# Patient Record
Sex: MALE | Race: WHITE | NOT HISPANIC OR LATINO | Employment: OTHER | URBAN - NONMETROPOLITAN AREA
[De-identification: names, ages, dates, MRNs, and addresses within clinical notes are randomized per-mention and may not be internally consistent; named-entity substitution may affect disease eponyms.]

---

## 2017-01-17 ENCOUNTER — OFFICE VISIT (OUTPATIENT)
Dept: CARDIOLOGY | Facility: CLINIC | Age: 72
End: 2017-01-17
Payer: MEDICARE

## 2017-01-17 VITALS
BODY MASS INDEX: 30.93 KG/M2 | WEIGHT: 241 LBS | SYSTOLIC BLOOD PRESSURE: 132 MMHG | HEART RATE: 71 BPM | HEIGHT: 74 IN | DIASTOLIC BLOOD PRESSURE: 80 MMHG | OXYGEN SATURATION: 93 %

## 2017-01-17 DIAGNOSIS — I10 ESSENTIAL HYPERTENSION: ICD-10-CM

## 2017-01-17 DIAGNOSIS — R00.2 PALPITATIONS: ICD-10-CM

## 2017-01-17 DIAGNOSIS — E78.5 HYPERLIPIDEMIA, UNSPECIFIED HYPERLIPIDEMIA TYPE: ICD-10-CM

## 2017-01-17 DIAGNOSIS — R06.09 DOE (DYSPNEA ON EXERTION): ICD-10-CM

## 2017-01-17 PROCEDURE — 99214 OFFICE O/P EST MOD 30 MIN: CPT | Performed by: INTERNAL MEDICINE

## 2017-01-17 NOTE — Clinical Note
[facility] [address],   [address]  [place] [address]  Phone: [phone]  Fax: [phone]              Dio Bullard  1945    Encounter Date: 1/17/2017    Getachew Slaughter M.D.    Thank you for the referral. I had the pleasure of seeing Dio Bullard today in cardiology clinic. I've attached my visit note below. If you have any questions please feel free to give me a call anytime.      Prakash Humphries MD, PhD, [facility]  Cardiology and Lipidology  [facility]                                                                  PROGRESS NOTE:  Chief Complaint   Patient presents with   • Follow-Up       This patient is an established male who is here today to discuss:  Recheck BP and question about meds    Patient Active Problem List    Diagnosis Date Noted   • Insomnia 12/01/2016   • Hyperlipidemia 05/18/2016   • Hyperglycemia 05/18/2016   • Essential hypertension 08/05/2015   • Low back pain 03/23/2015   • Hamstring tear 01/29/2015   • Lumbar disc disease with radiculopathy 12/10/2014   • Osteoarthritis of left knee 09/05/2014   • Localized osteoarthritis of right knee 09/05/2014       Past Medical History   Diagnosis Date   • Hypertension    • Insomnia    • Cancer (CMS-HCC)    • Gilbert's disease      Past Surgical History   Procedure Laterality Date   • Hernia repair     • Prostatectomy, radial         Current Outpatient Prescriptions   Medication Sig Dispense Refill   • amlodipine (NORVASC) 10 MG Tab Take 1 Tab by mouth every day. 30 Tab 11   • Multiple Vitamins-Minerals (PRESERVISION AREDS) Cap Take  by mouth.     • Multiple Vitamins-Minerals (CENTRUM SILVER ADULT 50+ PO) Take  by mouth.     • Misc Natural Products (GLUCOS-CHONDROIT-MSM COMPLEX PO) Take  by mouth.     • Naproxen Sodium (ALEVE PO) Take  by mouth.     • zolpidem (AMBIEN CR) 12.5 MG CR tablet TAKE ONE TABLET BY MOUTH EVERY EVENING  "AS NEEDED FOR SLEEP 30 Tab 0     No current facility-administered medications for this visit.     Review of patient's allergies indicates no known allergies.      Review of Systems:     Constitutional: Denies fevers, Decreased 40 pd/8 months weight changes intentionally  Eyes: Denies changes in vision, no eye pain  Ears/Nose/Throat/Mouth: Denies nasal congestion or sore throat   Cardiovascular: Denies chest pain or palpitations   Respiratory: Denies shortness of breath , Denies cough  Gastrointestinal/Hepatic: Denies abdominal pain, nausea, vomiting, diarrhea, constipation or GI bleeding   Genitourinary: Denies bladder dysfunction, dysuria or frequency  Musculoskeletal/Rheum: Denies  joint pain and swelling   Skin/Breast: Denies rash, denies breast lumps or discharge  Neurological: Denies headache, confusion, memory loss or focal weakness/parasthesias  Psychiatric: denies mood disorder   Endocrine: denies hx of diabetes or thyroid dysfunction  Heme/Oncology/Lymph Nodes: Denies enlarged lymph nodes, denies brusing or known bleeding disorder  Allergic/Immunologic: Denies hx of allergies      All other systems were reviewed and are negative (AMA/CMS criteria)      Blood pressure 132/80, pulse 71, height 1.88 m (6' 2.02\"), weight 109.317 kg (241 lb), SpO2 93 %.  General Appearance: Well developed, Well nourished, No acute distress, Non-toxic appearance.    HENT: Normocephalic, Atraumatic, Oropharynx moist mucous membranes, Dentition: Mallampati 4 OP, Nose normal.    Eyes: PERRLA, EOMI, Conjunctiva normal, No discharge.  Neck: Normal range of motion, No cervical tenderness, Supple, No stridor, no JVD . No thyromegaly. No carotid bruit.  Cardiovascular: Normal heart rate, Normal rhythm, S1, S2, no S3, S4; No gallops; No murmurs, No rubs, . Extremitites with intact distal pulses, no cyanosis, clubbing or edema. No heaves, thrills, HJR;  Peripheral pulses: carotid 2+, brachial 2+, radial 2+, ulnar 2+, femoral 2+, popliteal " 2+, PT 2+, DP 2+;  Lungs: Respiratory effort is normal. Normal breath sounds, breath sounds clear to auscultation bilaterally, no rales, no rhonchi, no wheezing.    Abdomen: Bowel sounds normal, Soft, No tenderness, No guarding, No rebound, No masses, No hepatosplenomegaly.  Skin: Warm, Dry, No erythema, No rash, no induration or crepitus.  Neurologic: Alert & oriented x 3, Normal motor function, Abn  sensory function at both feet, No focal deficits noted, cranial nerves II through XII are normal, normal gait.  Psychiatric: Affect normal, Judgment normal, Mood nallely    Results for EARNESTINE MADRID (MRN [id number]) as of 1/17/2017 13:49   Ref. Range 4/7/2016 13:40 5/10/2016 07:50 5/10/2016 08:00   WBC Latest Ref Range: 4.8-10.8 K/uL  5.9    RBC Latest Ref Range: 4.70-6.10 M/uL  5.85    Hemoglobin Latest Ref Range: 14.0-18.0 g/dL  16.5    Hematocrit Latest Ref Range: 42.0-52.0 %  49.2    MCV Latest Ref Range: 80.0-94.0 fL  84.1    MCH Latest Ref Range: 27.0-31.0 pg  28.2    MCHC Latest Ref Range: 33.0-37.0 g/dL  33.5    RDW Latest Ref Range: 11.5-14.5 %  13.4    Platelet Count Latest Ref Range: 130-400 K/uL  144    MPV Latest Ref Range: 7.4-10.4 fL  9.0    Neutrophils Automated Latest Ref Range: 39.0-70.0 %  68.4    Abs Neutrophils Automated Latest Ref Range: 1.8-7.7 K/uL  4.0    Lymphocytes Automated Latest Ref Range: 21.0-50.0 %  21.4    Abs Lymph Automated Latest Ref Range: 1.2-4.8 K/uL  1.3    Eosinophils Automated Latest Ref Range: 0.0-5.0 %  1.7    Basophils Automated Latest Ref Range: 0.0-3.0 %  0.7    Monocytes Automated Latest Ref Range: 2.0-9.0 %  7.5    Eosinophil Count, Blood Latest Ref Range: 0.00-0.50 K/uL  0.10    Sodium Latest Ref Range: 136-145 mmol/L  142    Potassium Latest Ref Range: 3.5-5.1 mmol/L  4.3    Chloride Latest Ref Range:  mmol/L  108 (H)    Co2 Latest Ref Range: 21-32 mmol/L  19 (L)    Anion Gap Latest Ref Range: 10-18 mmol/L  19 (H)    Glucose Latest Ref Range: 74-99 mg/dL  103  (H)    Bun Latest Ref Range: 7-18 mg/dL  25 (H)    Creatinine Latest Ref Range: 0.8-1.3 mg/dL  0.8    GFR If  Latest Ref Range: >60 mL/min/1.73 m 2  >60    GFR If Non  Latest Ref Range: >60 mL/min/1.73 m 2  >60    Calcium Latest Ref Range: 8.5-11.0 mg/dL  8.5    AST(SGOT) Latest Ref Range: 15-37 U/L  26    ALT(SGPT) Latest Ref Range: 12-78 U/L  41    Alkaline Phosphatase Latest Ref Range:  U/L  88    Total Bilirubin Latest Ref Range: 0.2-1.0 mg/dL  1.3 (H)    Albumin Latest Ref Range: 3.4-5.0 g/dL  4.0    Total Protein Latest Ref Range: 6.4-8.2 g/dL  7.3    A-G Ratio Unknown  1.2    Cholesterol,Tot Latest Ref Range: 120-200 mg/dL  140    Triglycerides Latest Ref Range: 0-150 mg/dL  30    HDL Latest Ref Range: 40.0-60.0 mg/dL  57.0    Non HDL Cholesterol Latest Ref Range:    83    LDL Latest Ref Range: <100 mg/dL  77    Chol-Hdl Ratio Unknown  2.46    Prostatic Specific Antigen Tot Latest Ref Range: 0.00-4.00 ng/mL <0.13     TSH Latest Ref Range: 0.34-4.82 uIU/mL  0.93    Free T-4 Latest Ref Range: 0.77-1.61 ng/dL  1.01      Assessment and Plan.   71 y.o. male has lose 40 pds since last 4/2016 intentionally; No palpitation; BP is better with WT loss;     1. Essential hypertension  controlled    2. Palpitations    - CBC WITHOUT DIFFERENTIAL  - COMP METABOLIC PANEL; Future    3. JENNINGS (dyspnea on exertion)  gone    4. Hyperlipidemia, unspecified hyperlipidemia type    - LIPID PANEL      1. Essential hypertension     2. Palpitations  CBC WITHOUT DIFFERENTIAL    COMP METABOLIC PANEL   3. JENNINGS (dyspnea on exertion)     4. Hyperlipidemia, unspecified hyperlipidemia type  LIPID PANEL             Getachew Slaughter M.D.  [address] [place] [address] [address]  [place] [address]  VIA In Basket

## 2017-01-17 NOTE — MR AVS SNAPSHOT
"        Dio Bullard   2017 1:20 PM   Office Visit   MRN: [id number]    Department:  [facility]   Dept Phone:  [phone]    Description:  Male : 1945   Provider:  Prakash Humphries MD,[facility]           Reason for Visit     Follow-Up           Allergies as of 2017     No Known Allergies      You were diagnosed with     Essential hypertension   [3247607]       Palpitations   [785.1.ICD-9-CM]       JENNINGS (dyspnea on exertion)   [934545]       Hyperlipidemia, unspecified hyperlipidemia type   [7718433]         Vital Signs     Blood Pressure Pulse Height Weight Body Mass Index Oxygen Saturation    132/80 mmHg 71 1.88 m (6' 2.02\") 109.317 kg (241 lb) 30.93 kg/m2 93%    Smoking Status                   Never Smoker            Basic Information     Date Of Birth Sex Race Ethnicity Preferred Language    1945 Male White Non- English      Your appointments     2017  3:00 PM   Annual Exam with Getachew Slaughter M.D.   [facility] (--)    [address] [place] [id number]   [phone]              Problem List              ICD-10-CM Priority Class Noted - Resolved    Osteoarthritis of left knee M17.9   2014 - Present    Localized osteoarthritis of right knee M17.9   2014 - Present    Lumbar disc disease with radiculopathy M51.16   12/10/2014 - Present    Hamstring tear    2015 - Present    Low back pain M54.5   3/23/2015 - Present    Essential hypertension I10   2015 - Present    Hyperlipidemia E78.5   2016 - Present    Hyperglycemia R73.9   2016 - Present    Insomnia G47.00   2016 - Present      Health Maintenance        Date Due Completion Dates    IMM DTaP/Tdap/Td Vaccine (1 - Tdap) 1966 ---    IMM ZOSTER VACCINE 2007 ---    IMM PNEUMOCOCCAL 65+ (ADULT) LOW/MEDIUM RISK SERIES (1 of 2 - PCV13) 2012 ---    IMM INFLUENZA (1) 2016 ---    COLONOSCOPY 3/14/2024 3/14/2014, 2003 (Prv Comp)    Override on 2003: " Previously completed            Current Immunizations     No immunizations on file.      Below and/or attached are the medications your provider expects you to take. Review all of your home medications and newly ordered medications with your provider and/or pharmacist. Follow medication instructions as directed by your provider and/or pharmacist. Please keep your medication list with you and share with your provider. Update the information when medications are discontinued, doses are changed, or new medications (including over-the-counter products) are added; and carry medication information at all times in the event of emergency situations     Allergies:  No Known Allergies          Medications  Valid as of: January 17, 2017 -  1:58 PM    Generic Name Brand Name Tablet Size Instructions for use    AmLODIPine Besylate (Tab) NORVASC 10 MG Take 1 Tab by mouth every day.        Misc Natural Products   Take  by mouth.        Multiple Vitamins-Minerals   Take  by mouth.        Multiple Vitamins-Minerals (Cap) PRESERVISION AREDS  Take  by mouth.        Naproxen Sodium   Take  by mouth.        Zolpidem Tartrate (Tab CR) AMBIEN CR 12.5 MG TAKE ONE TABLET BY MOUTH EVERY EVENING AS NEEDED FOR SLEEP        .                 Medicines prescribed today were sent to:     [facility] #[id number] - [place], [place] - [address] [address]    [address] [address]    Phone: [phone] Fax: [phone]    Open 24 Hours?: No      Medication refill instructions:       If your prescription bottle indicates you have medication refills left, it is not necessary to call your provider’s office. Please contact your pharmacy and they will refill your medication.    If your prescription bottle indicates you do not have any refills left, you may request refills at any time through one of the following ways: The online [employer] system (except Urgent Care), by calling your provider’s office, or by asking your pharmacy to contact your  provider’s office with a refill request. Medication refills are processed only during regular business hours and may not be available until the next business day. Your provider may request additional information or to have a follow-up visit with you prior to refilling your medication.   *Please Note: Medication refills are assigned a new Rx number when refilled electronically. Your pharmacy may indicate that no refills were authorized even though a new prescription for the same medication is available at the pharmacy. Please request the medicine by name with the pharmacy before contacting your provider for a refill.        Your To Do List     Future Labs/Procedures Complete By Expires    COMP METABOLIC PANEL  As directed 1/17/2018         MyChart Status: Patient Declined

## 2017-01-17 NOTE — PROGRESS NOTES
Chief Complaint   Patient presents with   • Follow-Up       This patient is an established male who is here today to discuss:  Recheck BP and question about meds    Patient Active Problem List    Diagnosis Date Noted   • Insomnia 12/01/2016   • Hyperlipidemia 05/18/2016   • Hyperglycemia 05/18/2016   • Essential hypertension 08/05/2015   • Low back pain 03/23/2015   • Hamstring tear 01/29/2015   • Lumbar disc disease with radiculopathy 12/10/2014   • Osteoarthritis of left knee 09/05/2014   • Localized osteoarthritis of right knee 09/05/2014       Past Medical History   Diagnosis Date   • Hypertension    • Insomnia    • Cancer (CMS-HCC)    • Gilbert's disease      Past Surgical History   Procedure Laterality Date   • Hernia repair     • Prostatectomy, radial         Current Outpatient Prescriptions   Medication Sig Dispense Refill   • amlodipine (NORVASC) 10 MG Tab Take 1 Tab by mouth every day. 30 Tab 11   • Multiple Vitamins-Minerals (PRESERVISION AREDS) Cap Take  by mouth.     • Multiple Vitamins-Minerals (CENTRUM SILVER ADULT 50+ PO) Take  by mouth.     • Misc Natural Products (GLUCOS-CHONDROIT-MSM COMPLEX PO) Take  by mouth.     • Naproxen Sodium (ALEVE PO) Take  by mouth.     • zolpidem (AMBIEN CR) 12.5 MG CR tablet TAKE ONE TABLET BY MOUTH EVERY EVENING AS NEEDED FOR SLEEP 30 Tab 0     No current facility-administered medications for this visit.     Review of patient's allergies indicates no known allergies.      Review of Systems:     Constitutional: Denies fevers, Decreased 40 pd/8 months weight changes intentionally  Eyes: Denies changes in vision, no eye pain  Ears/Nose/Throat/Mouth: Denies nasal congestion or sore throat   Cardiovascular: Denies chest pain or palpitations   Respiratory: Denies shortness of breath , Denies cough  Gastrointestinal/Hepatic: Denies abdominal pain, nausea, vomiting, diarrhea, constipation or GI bleeding   Genitourinary: Denies bladder dysfunction, dysuria or  "frequency  Musculoskeletal/Rheum: Denies  joint pain and swelling   Skin/Breast: Denies rash, denies breast lumps or discharge  Neurological: Denies headache, confusion, memory loss or focal weakness/parasthesias  Psychiatric: denies mood disorder   Endocrine: denies hx of diabetes or thyroid dysfunction  Heme/Oncology/Lymph Nodes: Denies enlarged lymph nodes, denies brusing or known bleeding disorder  Allergic/Immunologic: Denies hx of allergies      All other systems were reviewed and are negative (AMA/CMS criteria)      Blood pressure 132/80, pulse 71, height 1.88 m (6' 2.02\"), weight 109.317 kg (241 lb), SpO2 93 %.  General Appearance: Well developed, Well nourished, No acute distress, Non-toxic appearance.    HENT: Normocephalic, Atraumatic, Oropharynx moist mucous membranes, Dentition: Mallampati 4 OP, Nose normal.    Eyes: PERRLA, EOMI, Conjunctiva normal, No discharge.  Neck: Normal range of motion, No cervical tenderness, Supple, No stridor, no JVD . No thyromegaly. No carotid bruit.  Cardiovascular: Normal heart rate, Normal rhythm, S1, S2, no S3, S4; No gallops; No murmurs, No rubs, . Extremitites with intact distal pulses, no cyanosis, clubbing or edema. No heaves, thrills, HJR;  Peripheral pulses: carotid 2+, brachial 2+, radial 2+, ulnar 2+, femoral 2+, popliteal 2+, PT 2+, DP 2+;  Lungs: Respiratory effort is normal. Normal breath sounds, breath sounds clear to auscultation bilaterally, no rales, no rhonchi, no wheezing.    Abdomen: Bowel sounds normal, Soft, No tenderness, No guarding, No rebound, No masses, No hepatosplenomegaly.  Skin: Warm, Dry, No erythema, No rash, no induration or crepitus.  Neurologic: Alert & oriented x 3, Normal motor function, Abn  sensory function at both feet, No focal deficits noted, cranial nerves II through XII are normal, normal gait.  Psychiatric: Affect normal, Judgment normal, Mood nallely    Results for EARNESTINE MADRID (MRN [id number]) as of 1/17/2017 13:49   Ref. " Range 4/7/2016 13:40 5/10/2016 07:50 5/10/2016 08:00   WBC Latest Ref Range: 4.8-10.8 K/uL  5.9    RBC Latest Ref Range: 4.70-6.10 M/uL  5.85    Hemoglobin Latest Ref Range: 14.0-18.0 g/dL  16.5    Hematocrit Latest Ref Range: 42.0-52.0 %  49.2    MCV Latest Ref Range: 80.0-94.0 fL  84.1    MCH Latest Ref Range: 27.0-31.0 pg  28.2    MCHC Latest Ref Range: 33.0-37.0 g/dL  33.5    RDW Latest Ref Range: 11.5-14.5 %  13.4    Platelet Count Latest Ref Range: 130-400 K/uL  144    MPV Latest Ref Range: 7.4-10.4 fL  9.0    Neutrophils Automated Latest Ref Range: 39.0-70.0 %  68.4    Abs Neutrophils Automated Latest Ref Range: 1.8-7.7 K/uL  4.0    Lymphocytes Automated Latest Ref Range: 21.0-50.0 %  21.4    Abs Lymph Automated Latest Ref Range: 1.2-4.8 K/uL  1.3    Eosinophils Automated Latest Ref Range: 0.0-5.0 %  1.7    Basophils Automated Latest Ref Range: 0.0-3.0 %  0.7    Monocytes Automated Latest Ref Range: 2.0-9.0 %  7.5    Eosinophil Count, Blood Latest Ref Range: 0.00-0.50 K/uL  0.10    Sodium Latest Ref Range: 136-145 mmol/L  142    Potassium Latest Ref Range: 3.5-5.1 mmol/L  4.3    Chloride Latest Ref Range:  mmol/L  108 (H)    Co2 Latest Ref Range: 21-32 mmol/L  19 (L)    Anion Gap Latest Ref Range: 10-18 mmol/L  19 (H)    Glucose Latest Ref Range: 74-99 mg/dL  103 (H)    Bun Latest Ref Range: 7-18 mg/dL  25 (H)    Creatinine Latest Ref Range: 0.8-1.3 mg/dL  0.8    GFR If  Latest Ref Range: >60 mL/min/1.73 m 2  >60    GFR If Non  Latest Ref Range: >60 mL/min/1.73 m 2  >60    Calcium Latest Ref Range: 8.5-11.0 mg/dL  8.5    AST(SGOT) Latest Ref Range: 15-37 U/L  26    ALT(SGPT) Latest Ref Range: 12-78 U/L  41    Alkaline Phosphatase Latest Ref Range:  U/L  88    Total Bilirubin Latest Ref Range: 0.2-1.0 mg/dL  1.3 (H)    Albumin Latest Ref Range: 3.4-5.0 g/dL  4.0    Total Protein Latest Ref Range: 6.4-8.2 g/dL  7.3    A-G Ratio Unknown  1.2    Cholesterol,Tot Latest  Ref Range: 120-200 mg/dL  140    Triglycerides Latest Ref Range: 0-150 mg/dL  30    HDL Latest Ref Range: 40.0-60.0 mg/dL  57.0    Non HDL Cholesterol Latest Ref Range:    83    LDL Latest Ref Range: <100 mg/dL  77    Chol-Hdl Ratio Unknown  2.46    Prostatic Specific Antigen Tot Latest Ref Range: 0.00-4.00 ng/mL <0.13     TSH Latest Ref Range: 0.34-4.82 uIU/mL  0.93    Free T-4 Latest Ref Range: 0.77-1.61 ng/dL  1.01      Assessment and Plan.   71 y.o. male has lose 40 pds since last 4/2016 intentionally; No palpitation; BP is better with WT loss;     1. Essential hypertension  controlled    2. Palpitations    - CBC WITHOUT DIFFERENTIAL  - COMP METABOLIC PANEL; Future    3. JENNINGS (dyspnea on exertion)  gone    4. Hyperlipidemia, unspecified hyperlipidemia type    - LIPID PANEL      1. Essential hypertension     2. Palpitations  CBC WITHOUT DIFFERENTIAL    COMP METABOLIC PANEL   3. JENNINGS (dyspnea on exertion)     4. Hyperlipidemia, unspecified hyperlipidemia type  LIPID PANEL

## 2017-04-19 DIAGNOSIS — I10 ESSENTIAL HYPERTENSION: ICD-10-CM

## 2017-04-24 RX ORDER — AMLODIPINE BESYLATE 10 MG/1
TABLET ORAL
Qty: 90 TAB | Refills: 3 | Status: SHIPPED | OUTPATIENT
Start: 2017-04-24 | End: 2018-04-24 | Stop reason: SDUPTHER

## 2017-08-01 ENCOUNTER — OFFICE VISIT (OUTPATIENT)
Dept: CARDIOLOGY | Facility: CLINIC | Age: 72
End: 2017-08-01
Payer: MEDICARE

## 2017-08-01 VITALS
OXYGEN SATURATION: 94 % | HEART RATE: 79 BPM | BODY MASS INDEX: 31.7 KG/M2 | SYSTOLIC BLOOD PRESSURE: 130 MMHG | HEIGHT: 74 IN | WEIGHT: 247 LBS | DIASTOLIC BLOOD PRESSURE: 80 MMHG

## 2017-08-01 DIAGNOSIS — R00.2 PALPITATIONS: ICD-10-CM

## 2017-08-01 DIAGNOSIS — E78.5 HYPERLIPIDEMIA, UNSPECIFIED HYPERLIPIDEMIA TYPE: ICD-10-CM

## 2017-08-01 DIAGNOSIS — I10 ESSENTIAL HYPERTENSION: ICD-10-CM

## 2017-08-01 DIAGNOSIS — R06.09 DOE (DYSPNEA ON EXERTION): ICD-10-CM

## 2017-08-01 DIAGNOSIS — E66.9 OBESITY (BMI 30.0-34.9): ICD-10-CM

## 2017-08-01 PROCEDURE — 99214 OFFICE O/P EST MOD 30 MIN: CPT | Performed by: INTERNAL MEDICINE

## 2017-08-01 RX ORDER — DIPHENHYDRAMINE HCL 25 MG
25 TABLET ORAL EVERY 6 HOURS PRN
COMMUNITY
End: 2021-03-16

## 2017-08-01 NOTE — PROGRESS NOTES
Chief Complaint   Patient presents with   • Follow-Up     htn         This patient is an established male who is here today to discuss:  Recheck BP and question about meds        Patient Active Problem List    Diagnosis Date Noted   • Insomnia 12/01/2016   • Hyperlipidemia 05/18/2016   • Hyperglycemia 05/18/2016   • Essential hypertension 08/05/2015   • Low back pain 03/23/2015   • Hamstring tear 01/29/2015   • Lumbar disc disease with radiculopathy 12/10/2014   • Osteoarthritis of left knee 09/05/2014   • Localized osteoarthritis of right knee 09/05/2014       Past Medical History   Diagnosis Date   • Hypertension    • Insomnia    • Cancer (CMS-HCC)    • Gilbert's disease      Past Surgical History   Procedure Laterality Date   • Hernia repair     • Prostatectomy, radial       Social History     Social History   • Marital Status:      Spouse Name: N/A   • Number of Children: N/A   • Years of Education: N/A     Social History Main Topics   • Smoking status: Never Smoker    • Smokeless tobacco: Never Used   • Alcohol Use: No      Comment: occasionally   • Drug Use: No   • Sexual Activity: Not Asked     Other Topics Concern   • None     Social History Narrative     Family History   Problem Relation Age of Onset   • Heart Disease Neg Hx        Current Outpatient Prescriptions   Medication Sig Dispense Refill   • Diphenhydramine-APAP, sleep, (TYLENOL PM EXTRA STRENGTH)  MG Tab Take  by mouth.     • diphenhydrAMINE (BENADRYL) 25 MG Tab Take 25 mg by mouth every 6 hours as needed for Sleep.     • amlodipine (NORVASC) 10 MG Tab TAKE ONE TABLET BY MOUTH DAILY 90 Tab 3   • Multiple Vitamins-Minerals (PRESERVISION AREDS) Cap Take  by mouth.     • Multiple Vitamins-Minerals (CENTRUM SILVER ADULT 50+ PO) Take  by mouth.     • Misc Natural Products (GLUCOS-CHONDROIT-MSM COMPLEX PO) Take  by mouth.     • zolpidem (AMBIEN CR) 12.5 MG CR tablet TAKE ONE TABLET BY MOUTH EVERY EVENING AS NEEDED FOR SLEEP 30 Tab 0   •  "Naproxen Sodium (ALEVE PO) Take  by mouth.       No current facility-administered medications for this visit.     Review of patient's allergies indicates no known allergies.    Review of Systems:     Constitutional: Denies fevers, Denies weight changes  Eyes: Denies changes in vision, no eye pain  Ears/Nose/Throat/Mouth: Denies nasal congestion or sore throat   Cardiovascular: Denies chest pain or palpitations   Respiratory: Denies shortness of breath , Denies cough  Gastrointestinal/Hepatic: Denies abdominal pain, nausea, vomiting, diarrhea, constipation or GI bleeding   Genitourinary: Denies bladder dysfunction, dysuria or frequency  Musculoskeletal/Rheum: Denies  joint pain and swelling   Skin/Breast: Denies rash, denies breast lumps or discharge  Neurological: Denies headache, confusion, memory loss or focal weakness/parasthesias  Psychiatric: denies mood disorder   Endocrine: denies hx of diabetes or thyroid dysfunction  Heme/Oncology/Lymph Nodes: Denies enlarged lymph nodes, denies brusing or known bleeding disorder  Allergic/Immunologic: Denies hx of allergies      All other systems were reviewed and are negative (AMA/CMS criteria)      Blood pressure 130/80, pulse 79, height 1.88 m (6' 2.02\"), weight 112.038 kg (247 lb), SpO2 94 %.  General Appearance: Well developed, Well nourished, No acute distress, Non-toxic appearance.    HENT: Normocephalic, Atraumatic, Oropharynx moist mucous membranes, Dentition: Mallampati 4 OP, Nose normal.    Eyes: PERRLA, EOMI, Conjunctiva normal, No discharge.  Neck: Normal range of motion, No cervical tenderness, Supple, No stridor, no JVD . No thyromegaly. No carotid bruit.  Cardiovascular: Normal heart rate, Normal rhythm, S1, S2, no S3, S4; No gallops; No murmurs, No rubs, . Extremitites with intact distal pulses, no cyanosis, clubbing or edema. No heaves, thrills, HJR;  Peripheral pulses: carotid 2+, brachial 2+, radial 2+, ulnar 2+, femoral 2+, popliteal 2+, PT 2+, DP " 2+;  Lungs: Respiratory effort is normal. Normal breath sounds, breath sounds clear to auscultation bilaterally, no rales, no rhonchi, no wheezing.    Abdomen: Bowel sounds normal, Soft, No tenderness, No guarding, No rebound, No masses, No hepatosplenomegaly.  Skin: Warm, Dry, No erythema, No rash, no induration or crepitus.  Neurologic: Alert & oriented x 3, Normal motor function, Abn  sensory function at both feet, No focal deficits noted, cranial nerves II through XII are normal, normal gait.  Psychiatric: Affect normal, Judgment normal, Mood nallely    Results for EARNESTINE MADRID (MRN [id number]) as of 8/1/2017 15:26   Ref. Range 2/16/2017 10:15 5/1/2017 13:30 7/10/2017 10:00   WBC Latest Ref Range: 4.8-10.8 K/uL   7.5   RBC Latest Ref Range: 4.70-6.10 M/uL   5.46   Hemoglobin Latest Ref Range: 14.0-18.0 g/dL   16.0   Hematocrit Latest Ref Range: 42.0-52.0 %   45.9   MCV Latest Ref Range: 80.0-94.0 fL   84.1   MCH Latest Ref Range: 27.0-31.0 pg   29.3   MCHC Latest Ref Range: 33.0-37.0 g/dL   34.9   RDW Latest Ref Range: 11.5-14.5 %   13.3   Platelet Count Latest Ref Range: 130-400 K/uL   147   MPV Latest Ref Range: 7.4-10.4 fL   9.2   Neutrophils Automated Latest Ref Range: 39.0-70.0 %   68.9   Abs Neutrophils Automated Latest Ref Range: 1.8-7.7 K/uL   5.2   Lymphocytes Automated Latest Ref Range: 21.0-50.0 %   22.4   Abs Lymph Automated Latest Ref Range: 1.2-4.8 K/uL   1.7   Eosinophils Automated Latest Ref Range: 0.0-5.0 %   0.8   Basophils Automated Latest Ref Range: 0.0-3.0 %   0.4   Monocytes Automated Latest Ref Range: 2.0-9.0 %   6.8   Eosinophil Count, Blood Latest Ref Range: 0.00-0.50 K/uL   0.06   Sodium Latest Ref Range: 136-145 mmol/L   143   Potassium Latest Ref Range: 3.5-5.1 mmol/L   3.6   Chloride Latest Ref Range:  mmol/L   107   Co2 Latest Ref Range: 21-32 mmol/L   24   Anion Gap Latest Ref Range: 10-18 mmol/L   16   Glucose Latest Ref Range: 74-99 mg/dL   94   Bun Latest Ref Range: 7-18  mg/dL   18   Creatinine Latest Ref Range: 0.8-1.3 mg/dL 0.8  0.9   GFR If  Latest Ref Range: >60 mL/min/1.73 m 2 >60  >60   GFR If Non  Latest Ref Range: >60 mL/min/1.73 m 2 >60  >60   Calcium Latest Ref Range: 8.5-11.0 mg/dL   8.6   AST(SGOT) Latest Ref Range: 15-37 U/L   32   ALT(SGPT) Latest Ref Range: 12-78 U/L   46   Alkaline Phosphatase Latest Ref Range:  U/L   67   Total Bilirubin Latest Ref Range: 0.2-1.0 mg/dL   1.8 (H)   Albumin Latest Ref Range: 3.4-5.0 g/dL   3.9   Total Protein Latest Ref Range: 6.4-8.2 g/dL   6.9   A-G Ratio Unknown   1.3   Cholesterol,Tot Latest Ref Range: 120-200 mg/dL   144   Triglycerides Latest Ref Range: 0-150 mg/dL   31   HDL Latest Ref Range: 40.0-60.0 mg/dL   75.0 (H)   Non HDL Cholesterol Latest Ref Range:     69   LDL Latest Ref Range: <100 mg/dL   63   Chol-Hdl Ratio Unknown   1.92   Prostatic Specific Antigen Tot Latest Ref Range: 0.00-4.00 ng/mL  <0.13    TSH Latest Ref Range: 0.36-3.74 uIU/mL   1.21   Free T-4 Latest Ref Range: 0.76-1.46 ng/dL   1.25     Assessment and Plan.   71 y.o. male has HTN well controlled; K_ 3.6 aims for >4.0; RD to help with his diet and WT loss;     1. JENNINGS (dyspnea on exertion)  stable    2. Palpitations  rare    3. Essential hypertension  controlled    4. Hyperlipidemia, unspecified hyperlipidemia type  Reviewed and adequate, reassured    5. Obesity (BMI 30.0-34.9)  stable      Return to clinic in  6  months    1. JENNINGS (dyspnea on exertion)     2. Palpitations     3. Essential hypertension     4. Hyperlipidemia, unspecified hyperlipidemia type     5. Obesity (BMI 30.0-34.9)

## 2017-08-01 NOTE — Clinical Note
[facility] [address],   [address]nd [address]  [place] [address]  Phone: [phone]  Fax: [phone]              Dio Bullard  1945    Encounter Date: 8/1/2017    Getachew Slaughter M.D.    Thank you for the referral. I had the pleasure of seeing Dio Bullard today in cardiology clinic. I've attached my visit note below. If you have any questions please feel free to give me a call anytime.      Prakash Humphries MD, PhD, [facility]  Cardiology and Lipidology  [facility]                                                                    PROGRESS NOTE:  Chief Complaint   Patient presents with   • Follow-Up     htn         This patient is an established male who is here today to discuss:  Recheck BP and question about meds        Patient Active Problem List    Diagnosis Date Noted   • Insomnia 12/01/2016   • Hyperlipidemia 05/18/2016   • Hyperglycemia 05/18/2016   • Essential hypertension 08/05/2015   • Low back pain 03/23/2015   • Hamstring tear 01/29/2015   • Lumbar disc disease with radiculopathy 12/10/2014   • Osteoarthritis of left knee 09/05/2014   • Localized osteoarthritis of right knee 09/05/2014       Past Medical History   Diagnosis Date   • Hypertension    • Insomnia    • Cancer (CMS-HCC)    • Gilbert's disease      Past Surgical History   Procedure Laterality Date   • Hernia repair     • Prostatectomy, radial       Social History     Social History   • Marital Status:      Spouse Name: N/A   • Number of Children: N/A   • Years of Education: N/A     Social History Main Topics   • Smoking status: Never Smoker    • Smokeless tobacco: Never Used   • Alcohol Use: No      Comment: occasionally   • Drug Use: No   • Sexual Activity: Not Asked     Other Topics Concern   • None     Social History Narrative     Family History   Problem Relation Age of Onset   • Heart Disease Neg Hx        Current  "Outpatient Prescriptions   Medication Sig Dispense Refill   • Diphenhydramine-APAP, sleep, (TYLENOL PM EXTRA STRENGTH)  MG Tab Take  by mouth.     • diphenhydrAMINE (BENADRYL) 25 MG Tab Take 25 mg by mouth every 6 hours as needed for Sleep.     • amlodipine (NORVASC) 10 MG Tab TAKE ONE TABLET BY MOUTH DAILY 90 Tab 3   • Multiple Vitamins-Minerals (PRESERVISION AREDS) Cap Take  by mouth.     • Multiple Vitamins-Minerals (CENTRUM SILVER ADULT 50+ PO) Take  by mouth.     • Misc Natural Products (GLUCOS-CHONDROIT-MSM COMPLEX PO) Take  by mouth.     • zolpidem (AMBIEN CR) 12.5 MG CR tablet TAKE ONE TABLET BY MOUTH EVERY EVENING AS NEEDED FOR SLEEP 30 Tab 0   • Naproxen Sodium (ALEVE PO) Take  by mouth.       No current facility-administered medications for this visit.     Review of patient's allergies indicates no known allergies.    Review of Systems:     Constitutional: Denies fevers, Denies weight changes  Eyes: Denies changes in vision, no eye pain  Ears/Nose/Throat/Mouth: Denies nasal congestion or sore throat   Cardiovascular: Denies chest pain or palpitations   Respiratory: Denies shortness of breath , Denies cough  Gastrointestinal/Hepatic: Denies abdominal pain, nausea, vomiting, diarrhea, constipation or GI bleeding   Genitourinary: Denies bladder dysfunction, dysuria or frequency  Musculoskeletal/Rheum: Denies  joint pain and swelling   Skin/Breast: Denies rash, denies breast lumps or discharge  Neurological: Denies headache, confusion, memory loss or focal weakness/parasthesias  Psychiatric: denies mood disorder   Endocrine: denies hx of diabetes or thyroid dysfunction  Heme/Oncology/Lymph Nodes: Denies enlarged lymph nodes, denies brusing or known bleeding disorder  Allergic/Immunologic: Denies hx of allergies      All other systems were reviewed and are negative (AMA/CMS criteria)      Blood pressure 130/80, pulse 79, height 1.88 m (6' 2.02\"), weight 112.038 kg (247 lb), SpO2 94 %.  General " Appearance: Well developed, Well nourished, No acute distress, Non-toxic appearance.    HENT: Normocephalic, Atraumatic, Oropharynx moist mucous membranes, Dentition: Mallampati 4 OP, Nose normal.    Eyes: PERRLA, EOMI, Conjunctiva normal, No discharge.  Neck: Normal range of motion, No cervical tenderness, Supple, No stridor, no JVD . No thyromegaly. No carotid bruit.  Cardiovascular: Normal heart rate, Normal rhythm, S1, S2, no S3, S4; No gallops; No murmurs, No rubs, . Extremitites with intact distal pulses, no cyanosis, clubbing or edema. No heaves, thrills, HJR;  Peripheral pulses: carotid 2+, brachial 2+, radial 2+, ulnar 2+, femoral 2+, popliteal 2+, PT 2+, DP 2+;  Lungs: Respiratory effort is normal. Normal breath sounds, breath sounds clear to auscultation bilaterally, no rales, no rhonchi, no wheezing.    Abdomen: Bowel sounds normal, Soft, No tenderness, No guarding, No rebound, No masses, No hepatosplenomegaly.  Skin: Warm, Dry, No erythema, No rash, no induration or crepitus.  Neurologic: Alert & oriented x 3, Normal motor function, Abn  sensory function at both feet, No focal deficits noted, cranial nerves II through XII are normal, normal gait.  Psychiatric: Affect normal, Judgment normal, Mood nallely    Results for EARNESTINE MADRID (MRN [id number]) as of 8/1/2017 15:26   Ref. Range 2/16/2017 10:15 5/1/2017 13:30 7/10/2017 10:00   WBC Latest Ref Range: 4.8-10.8 K/uL   7.5   RBC Latest Ref Range: 4.70-6.10 M/uL   5.46   Hemoglobin Latest Ref Range: 14.0-18.0 g/dL   16.0   Hematocrit Latest Ref Range: 42.0-52.0 %   45.9   MCV Latest Ref Range: 80.0-94.0 fL   84.1   MCH Latest Ref Range: 27.0-31.0 pg   29.3   MCHC Latest Ref Range: 33.0-37.0 g/dL   34.9   RDW Latest Ref Range: 11.5-14.5 %   13.3   Platelet Count Latest Ref Range: 130-400 K/uL   147   MPV Latest Ref Range: 7.4-10.4 fL   9.2   Neutrophils Automated Latest Ref Range: 39.0-70.0 %   68.9   Abs Neutrophils Automated Latest Ref Range: 1.8-7.7  K/uL   5.2   Lymphocytes Automated Latest Ref Range: 21.0-50.0 %   22.4   Abs Lymph Automated Latest Ref Range: 1.2-4.8 K/uL   1.7   Eosinophils Automated Latest Ref Range: 0.0-5.0 %   0.8   Basophils Automated Latest Ref Range: 0.0-3.0 %   0.4   Monocytes Automated Latest Ref Range: 2.0-9.0 %   6.8   Eosinophil Count, Blood Latest Ref Range: 0.00-0.50 K/uL   0.06   Sodium Latest Ref Range: 136-145 mmol/L   143   Potassium Latest Ref Range: 3.5-5.1 mmol/L   3.6   Chloride Latest Ref Range:  mmol/L   107   Co2 Latest Ref Range: 21-32 mmol/L   24   Anion Gap Latest Ref Range: 10-18 mmol/L   16   Glucose Latest Ref Range: 74-99 mg/dL   94   Bun Latest Ref Range: 7-18 mg/dL   18   Creatinine Latest Ref Range: 0.8-1.3 mg/dL 0.8  0.9   GFR If  Latest Ref Range: >60 mL/min/1.73 m 2 >60  >60   GFR If Non  Latest Ref Range: >60 mL/min/1.73 m 2 >60  >60   Calcium Latest Ref Range: 8.5-11.0 mg/dL   8.6   AST(SGOT) Latest Ref Range: 15-37 U/L   32   ALT(SGPT) Latest Ref Range: 12-78 U/L   46   Alkaline Phosphatase Latest Ref Range:  U/L   67   Total Bilirubin Latest Ref Range: 0.2-1.0 mg/dL   1.8 (H)   Albumin Latest Ref Range: 3.4-5.0 g/dL   3.9   Total Protein Latest Ref Range: 6.4-8.2 g/dL   6.9   A-G Ratio Unknown   1.3   Cholesterol,Tot Latest Ref Range: 120-200 mg/dL   144   Triglycerides Latest Ref Range: 0-150 mg/dL   31   HDL Latest Ref Range: 40.0-60.0 mg/dL   75.0 (H)   Non HDL Cholesterol Latest Ref Range:     69   LDL Latest Ref Range: <100 mg/dL   63   Chol-Hdl Ratio Unknown   1.92   Prostatic Specific Antigen Tot Latest Ref Range: 0.00-4.00 ng/mL  <0.13    TSH Latest Ref Range: 0.36-3.74 uIU/mL   1.21   Free T-4 Latest Ref Range: 0.76-1.46 ng/dL   1.25     Assessment and Plan.   71 y.o. male has HTN well controlled; K_ 3.6 aims for >4.0; RD to help with his diet and WT loss;     1. JENNINGS (dyspnea on exertion)  stable    2. Palpitations  rare    3. Essential  hypertension  controlled    4. Hyperlipidemia, unspecified hyperlipidemia type  Reviewed and adequate, reassured    5. Obesity (BMI 30.0-34.9)  stable      Return to clinic in  6  months    1. JENNINGS (dyspnea on exertion)     2. Palpitations     3. Essential hypertension     4. Hyperlipidemia, unspecified hyperlipidemia type     5. Obesity (BMI 30.0-34.9)               Getachew Slaughter M.D.  [address] [place] [address] [address]  [facility] [address]  VIA In Basket

## 2017-08-01 NOTE — MR AVS SNAPSHOT
"Dio Bullard   2017 3:20 PM   Office Visit   MRN: [id number]    Department:  Heart [facility]   Dept Phone:  [phone]    Description:  Male : 1945   Provider:  Prakash Humphries MD,formerly [facility]           Reason for Visit     Follow-Up htn      Allergies as of 2017     No Known Allergies      You were diagnosed with     JENNINGS (dyspnea on exertion)   [890748]       Palpitations   [785.1.ICD-9-CM]       Essential hypertension   [9726224]       Hyperlipidemia, unspecified hyperlipidemia type   [6354417]       Obesity (BMI 30.0-34.9)   [045060]         Vital Signs     Blood Pressure Pulse Height Weight Body Mass Index Oxygen Saturation    130/80 mmHg 79 1.88 m (6' 2.02\") 112.038 kg (247 lb) 31.70 kg/m2 94%    Smoking Status                   Never Smoker            Basic Information     Date Of Birth Sex Race Ethnicity Preferred Language    1945 Male White Non- English      Your appointments     Dec 11, 2017  3:00 PM   Follow Up Visit with Getachew Slaughter M.D.   [facility] (--)    [address] [place] [address]   [phone]            2018  3:20 PM   FOLLOW UP with [facility]rakash Humphries MD,[facility] ([facility]    [address] [place] [address]  2nd Floor  [facility] [address]   [phone]              Problem List              ICD-10-CM Priority Class Noted - Resolved    Osteoarthritis of left knee M17.12   2014 - Present    Localized osteoarthritis of right knee M17.11   2014 - Present    Lumbar disc disease with radiculopathy M51.16   12/10/2014 - Present    Hamstring tear    2015 - Present    Low back pain M54.5   3/23/2015 - Present    Essential hypertension I10   2015 - Present    Hyperlipidemia E78.5   2016 - Present    Hyperglycemia R73.9   2016 - Present    Insomnia G47.00   2016 - Present      Health Maintenance        Date Due Completion Dates    IMM DTaP/Tdap/Td Vaccine (1 " - Tdap) 9/27/1966 ---    IMM ZOSTER VACCINE 9/27/2007 ---    IMM PNEUMOCOCCAL 65+ (ADULT) LOW/MEDIUM RISK SERIES (1 of 2 - PCV13) 9/27/2012 ---    IMM INFLUENZA (1) 9/1/2017 ---    COLONOSCOPY 3/14/2024 3/14/2014, 2/6/2003 (Prv Comp)    Override on 2/6/2003: Previously completed            Current Immunizations     No immunizations on file.      Below and/or attached are the medications your provider expects you to take. Review all of your home medications and newly ordered medications with your provider and/or pharmacist. Follow medication instructions as directed by your provider and/or pharmacist. Please keep your medication list with you and share with your provider. Update the information when medications are discontinued, doses are changed, or new medications (including over-the-counter products) are added; and carry medication information at all times in the event of emergency situations     Allergies:  No Known Allergies          Medications  Valid as of: August 01, 2017 -  3:38 PM    Generic Name Brand Name Tablet Size Instructions for use    AmLODIPine Besylate (Tab) NORVASC 10 MG TAKE ONE TABLET BY MOUTH DAILY        DiphenhydrAMINE HCl (Tab) BENADRYL 25 MG Take 25 mg by mouth every 6 hours as needed for Sleep.        Diphenhydramine-APAP (sleep) (Tab) Diphenhydramine-APAP (sleep)  MG Take  by mouth.        Misc Natural Products   Take  by mouth.        Multiple Vitamins-Minerals   Take  by mouth.        Multiple Vitamins-Minerals (Cap) PRESERVISION AREDS  Take  by mouth.        Naproxen Sodium   Take  by mouth.        Zolpidem Tartrate (Tab CR) AMBIEN CR 12.5 MG TAKE ONE TABLET BY MOUTH EVERY EVENING AS NEEDED FOR SLEEP        .                 Medicines prescribed today were sent to:     [id number] - [place], [place] - [address]    [address] [facility] [address]    Phone: [phone] Fax: [phone]    Open 24 Hours?: No      Medication refill instructions:       If your  prescription bottle indicates you have medication refills left, it is not necessary to call your provider’s office. Please contact your pharmacy and they will refill your medication.    If your prescription bottle indicates you do not have any refills left, you may request refills at any time through one of the following ways: The online [facility] system (except Urgent Care), by calling your provider’s office, or by asking your pharmacy to contact your provider’s office with a refill request. Medication refills are processed only during regular business hours and may not be available until the next business day. Your provider may request additional information or to have a follow-up visit with you prior to refilling your medication.   *Please Note: Medication refills are assigned a new Rx number when refilled electronically. Your pharmacy may indicate that no refills were authorized even though a new prescription for the same medication is available at the pharmacy. Please request the medicine by name with the pharmacy before contacting your provider for a refill.        Your To Do List     Future Labs/Procedures Complete By Expires    BASIC METABOLIC PANEL  As directed 8/1/2018         [facility] Access Code: [email or web address]  Expires: 8/31/2017  3:38 PM    [facility]  A secure, online tool to manage your health information     [employer]’s [facility]® is a secure, online tool that connects you to your personalized health information from the privacy of your home -- day or night - making it very easy for you to manage your healthcare. Once the activation process is completed, you can even access your medical information using the [facility] destinee, which is available for free in the Apple Destinee store or Google Play store.     [facility] provides the following levels of access (as shown below):   My Chart Features   [facility] Doctor [facility]  Specialists [facility]  Urgent  Care Non-[facility]  Primary Care  Doctor   Email your  healthcare team securely and privately 24/7 X X X    Manage appointments: schedule your next appointment; view details of past/upcoming appointments X      Request prescription refills. X      View recent personal medical records, including lab and immunizations X X X X   View health record, including health history, allergies, medications X X X X   Read reports about your outpatient visits, procedures, consult and ER notes X X X X   See your discharge summary, which is a recap of your hospital and/or ER visit that includes your diagnosis, lab results, and care plan. X X       How to register for [facility]:  1. Go to  [email or web address].  2. Click on the Sign Up Now box, which takes you to the New Member Sign Up page. You will need to provide the following information:  a. Enter your [facility] Access Code exactly as it appears at the top of this page. (You will not need to use this code after you’ve completed the sign-up process. If you do not sign up before the expiration date, you must request a new code.)   b. Enter your date of birth.   c. Enter your home email address.   d. Click Submit, and follow the next screen’s instructions.  3. Create a [facility] ID. This will be your [facility] login ID and cannot be changed, so think of one that is secure and easy to remember.  4. Create a [facility] password. You can change your password at any time.  5. Enter your Password Reset Question and Answer. This can be used at a later time if you forget your password.   6. Enter your e-mail address. This allows you to receive e-mail notifications when new information is available in [facility].  7. Click Sign Up. You can now view your health information.    For assistance activating your [employer] account, call [phone]

## 2017-08-03 ENCOUNTER — TELEPHONE (OUTPATIENT)
Dept: CARDIOLOGY | Facility: CLINIC | Age: 72
End: 2017-08-03

## 2017-08-03 NOTE — TELEPHONE ENCOUNTER
REF TO NUTRITION SERVICES HAS BEEN SENT [facility] NUTRITION THERAPY FOR CONSULTS. IF PT IS NOT CONTACTED IN A TIMELY MANNER PLEASE HAVE PT CONTACT THAT OFFICE [phone]

## 2017-10-13 ENCOUNTER — TELEPHONE (OUTPATIENT)
Dept: CARDIOLOGY | Facility: MEDICAL CENTER | Age: 72
End: 2017-10-13

## 2017-10-13 DIAGNOSIS — I10 ESSENTIAL HYPERTENSION: ICD-10-CM

## 2017-10-13 NOTE — TELEPHONE ENCOUNTER
Spoke with patient's wife and discussed lab results and let her know that potassium was in normal range and no changes to be made at this time and instructed her to have patient repeat lab draw prior to f/u appt on 1/3/18 - she verbalized understanding and order placed in computer - she will call back with any other questions or concerns

## 2017-12-11 PROBLEM — E66.9 OBESITY: Status: ACTIVE | Noted: 2017-12-11

## 2018-01-08 ENCOUNTER — OFFICE VISIT (OUTPATIENT)
Dept: CARDIOLOGY | Facility: CLINIC | Age: 73
End: 2018-01-08
Payer: MEDICARE

## 2018-01-08 VITALS
HEART RATE: 85 BPM | BODY MASS INDEX: 31.44 KG/M2 | SYSTOLIC BLOOD PRESSURE: 130 MMHG | DIASTOLIC BLOOD PRESSURE: 90 MMHG | HEIGHT: 74 IN | WEIGHT: 245 LBS | OXYGEN SATURATION: 95 %

## 2018-01-08 DIAGNOSIS — G47.30 SLEEP DISORDER BREATHING: ICD-10-CM

## 2018-01-08 DIAGNOSIS — I10 ESSENTIAL HYPERTENSION: ICD-10-CM

## 2018-01-08 DIAGNOSIS — R06.09 DOE (DYSPNEA ON EXERTION): ICD-10-CM

## 2018-01-08 DIAGNOSIS — E78.5 HYPERLIPIDEMIA, UNSPECIFIED HYPERLIPIDEMIA TYPE: ICD-10-CM

## 2018-01-08 DIAGNOSIS — E66.9 OBESITY (BMI 30.0-34.9): ICD-10-CM

## 2018-01-08 DIAGNOSIS — R00.2 PALPITATIONS: ICD-10-CM

## 2018-01-08 PROCEDURE — 99214 OFFICE O/P EST MOD 30 MIN: CPT | Performed by: INTERNAL MEDICINE

## 2018-01-08 NOTE — LETTER
[facility] [address],   [address]nd [address]  [place] [address]  Phone: [phone]  Fax: [phone]              Dio Bullard  1945    Encounter Date: 1/8/2018    Getachew Slaughter M.D.    Thank you for the referral. I had the pleasure of seeing Dio Bullard today in cardiology clinic. I've attached my visit note below. If you have any questions please feel free to give me a call anytime.      Prakash Humphries MD, PhD, [facility]  Cardiology and Lipidology  [facility]                                                                    PROGRESS NOTE:  Chief Complaint   Patient presents with   • Follow-Up     HTN, HLD    This patient is an established male who is here today to discuss:  ***    Patient Active Problem List    Diagnosis Date Noted   • Obesity 12/11/2017   • Insomnia 12/01/2016   • Hyperlipidemia 05/18/2016   • Hyperglycemia 05/18/2016   • Essential hypertension 08/05/2015   • Low back pain 03/23/2015   • Hamstring tear 01/29/2015   • Lumbar disc disease with radiculopathy 12/10/2014   • Osteoarthritis of left knee 09/05/2014   • Localized osteoarthritis of right knee 09/05/2014       Past Medical History:   Diagnosis Date   • Cancer (CMS-[facility])    • Gilbert's disease    • Hypertension    • Insomnia      Past Surgical History:   Procedure Laterality Date   • HERNIA REPAIR     • PROSTATECTOMY, RADIAL       Social History     Social History   • Marital status:      Spouse name: N/A   • Number of children: N/A   • Years of education: N/A     Social History Main Topics   • Smoking status: Never Smoker   • Smokeless tobacco: Never Used   • Alcohol use No      Comment: occasionally   • Drug use: No   • Sexual activity: Not on file     Other Topics Concern   • Not on file     Social History Narrative   • No narrative on file     Family History   Problem Relation Age of Onset   • Heart Disease Neg  "Hx        Current Outpatient Prescriptions   Medication Sig Dispense Refill   • zolpidem (AMBIEN CR) 12.5 MG CR tablet TAKE ONE TABLET BY MOUTH EVERY EVENING AS NEEDED FOR SLEEP 30 Tab 0   • Potassium 75 MG Tab Take  by mouth.     • vitamin D (CHOLECALCIFEROL) 1000 UNIT Tab Take 1,000 Units by mouth every day.     • diphenhydrAMINE (BENADRYL) 25 MG Tab Take 25 mg by mouth every 6 hours as needed for Sleep.     • amlodipine (NORVASC) 10 MG Tab TAKE ONE TABLET BY MOUTH DAILY 90 Tab 3   • Multiple Vitamins-Minerals (PRESERVISION AREDS) Cap Take  by mouth.     • Multiple Vitamins-Minerals (CENTRUM SILVER ADULT 50+ PO) Take  by mouth.     • Misc Natural Products (GLUCOS-CHONDROIT-MSM COMPLEX PO) Take  by mouth.       No current facility-administered medications for this visit.      Patient has no known allergies.    Review of Systems:     Constitutional: Denies fevers, Denies weight changes  Eyes: Denies changes in vision, no eye pain  Ears/Nose/Throat/Mouth: Denies nasal congestion or sore throat   Cardiovascular: Denies chest pain or palpitations   Respiratory: Denies shortness of breath , Denies cough  Gastrointestinal/Hepatic: Denies abdominal pain, nausea, vomiting, diarrhea, constipation or GI bleeding   Genitourinary: Denies bladder dysfunction, dysuria or frequency  Musculoskeletal/Rheum: Denies  joint pain and swelling   Skin/Breast: Denies rash, denies breast lumps or discharge  Neurological: Denies headache, confusion, memory loss or focal weakness/parasthesias  Psychiatric: denies mood disorder   Endocrine: denies hx of diabetes or thyroid dysfunction  Heme/Oncology/Lymph Nodes: Denies enlarged lymph nodes, denies brusing or known bleeding disorder  Allergic/Immunologic: Denies hx of allergies      All other systems were reviewed and are negative (AMA/CMS criteria)      Blood pressure 130/90, pulse 85, height 1.88 m (6' 2\"), weight 111.1 kg (245 lb), SpO2 95 %.  General Appearance: Well developed, Well " nourished, No acute distress, Non-toxic appearance.    HENT: Normocephalic, Atraumatic, Oropharynx moist mucous membranes, Dentition: Mallampati 4 OP, Nose normal.    Eyes: PERRLA, EOMI, Conjunctiva normal, No discharge.  Neck: Normal range of motion, No cervical tenderness, Supple, No stridor, no JVD . No thyromegaly. No carotid bruit.  Cardiovascular: Normal heart rate, Normal rhythm, S1, S2, no S3, S4; No gallops; No murmurs, No rubs, . Extremitites with intact distal pulses, no cyanosis, clubbing or edema. No heaves, thrills, HJR;  Peripheral pulses: carotid 2+, brachial 2+, radial 2+, ulnar 2+, femoral 2+, popliteal 2+, PT 2+, DP 2+;  Lungs: Respiratory effort is normal. Normal breath sounds, breath sounds clear to auscultation bilaterally, no rales, no rhonchi, no wheezing.    Abdomen: Bowel sounds normal, Soft, No tenderness, No guarding, No rebound, No masses, No hepatosplenomegaly.  Skin: Warm, Dry, No erythema, No rash, no induration or crepitus.  Neurologic: Alert & oriented x 3, Normal motor function, Abn  sensory function at both feet, No focal deficits noted, cranial nerves II through XII are normal, normal gait.  Psychiatric: Affect normal, Judgment normal, Mood nallely    Results for EARNESTINE MADRID (MRN [id number]) as of 1/8/2018 15:17   Ref. Range 2/16/2017 10:15 5/1/2017 13:30 7/10/2017 10:00 10/12/2017 13:35 12/28/2017 13:00   WBC Latest Ref Range: 4.8 - 10.8 K/uL   7.5     RBC Latest Ref Range: 4.70 - 6.10 M/uL   5.46     Hemoglobin Latest Ref Range: 14.0 - 18.0 g/dL   16.0     Hematocrit Latest Ref Range: 42.0 - 52.0 %   45.9     MCV Latest Ref Range: 80.0 - 94.0 fL   84.1     MCH Latest Ref Range: 27.0 - 31.0 pg   29.3     MCHC Latest Ref Range: 33.0 - 37.0 g/dL   34.9     RDW Latest Ref Range: 11.5 - 14.5 %   13.3     Platelet Count Latest Ref Range: 130 - 400 K/uL   147     MPV Latest Ref Range: 7.4 - 10.4 fL   9.2     Neutrophils Automated Latest Ref Range: 39.0 - 70.0 %   68.9     Abs  Neutrophils Automated Latest Ref Range: 1.8 - 7.7 K/uL   5.2     Lymphocytes Automated Latest Ref Range: 21.0 - 50.0 %   22.4     Abs Lymph Automated Latest Ref Range: 1.2 - 4.8 K/uL   1.7     Eosinophils Automated Latest Ref Range: 0.0 - 5.0 %   0.8     Basophils Automated Latest Ref Range: 0.0 - 3.0 %   0.4     Monocytes Automated Latest Ref Range: 2.0 - 9.0 %   6.8     Eosinophil Count, Blood Latest Ref Range: 0.00 - 0.50 K/uL   0.06     Sodium Latest Ref Range: 136 - 145 mmol/L   143 139 141   Potassium Latest Ref Range: 3.5 - 5.1 mmol/L   3.6 4.1 3.7   Chloride Latest Ref Range: 98 - 107 mmol/L   107 106 107   Co2 Latest Ref Range: 21 - 32 mmol/L   24 23 23   Anion Gap Latest Ref Range: 10 - 18 mmol/L   16 14 15   Glucose Latest Ref Range: 74 - 99 mg/dL   94 98 111 (H)   Bun Latest Ref Range: 7 - 18 mg/dL   18 22 (H) 20 (H)   Creatinine Latest Ref Range: 0.8 - 1.3 mg/dL 0.8  0.9 0.7 (L) 0.8   GFR If  Latest Ref Range: >60 mL/min/1.73 m 2 >60  >60 >60 >60   GFR If Non  Latest Ref Range: >60 mL/min/1.73 m 2 >60  >60 >60 >60   Calcium Latest Ref Range: 8.5 - 11.0 mg/dL   8.6 8.7 8.5   AST(SGOT) Latest Ref Range: 15 - 37 U/L   32     ALT(SGPT) Latest Ref Range: 12 - 78 U/L   46     Alkaline Phosphatase Latest Ref Range: 46 - 116 U/L   67     Total Bilirubin Latest Ref Range: 0.2 - 1.0 mg/dL   1.8 (H)     Albumin Latest Ref Range: 3.4 - 5.0 g/dL   3.9     Total Protein Latest Ref Range: 6.4 - 8.2 g/dL   6.9     A-G Ratio Unknown   1.3     Cholesterol,Tot Latest Ref Range: 120 - 200 mg/dL   144     Triglycerides Latest Ref Range: 0 - 150 mg/dL   31     HDL Latest Ref Range: 40.0 - 60.0 mg/dL   75.0 (H)     Non HDL Cholesterol Latest Ref Range: 30 - 160    69     LDL Latest Ref Range: <100 mg/dL   63     Chol-Hdl Ratio Unknown   1.92     Prostatic Specific Antigen Tot Latest Ref Range: 0.00 - 4.00 ng/mL  <0.13      TSH Latest Ref Range: 0.36 - 3.74 uIU/mL   1.21     Free T-4 Latest Ref  Range: 0.76 - 1.46 ng/dL   1.25       Assessment and Plan.   72 y.o. male ***    1. JENNINGS (dyspnea on exertion)  ***    2. Palpitations  ***    3. Essential hypertension  ***    4. Hyperlipidemia, unspecified hyperlipidemia type  ***    5. Obesity (BMI 30.0-34.9)  ***    6. Sleep disorder breathing  ***      Return to clinic in  *** weeks, months    1. JENNINGS (dyspnea on exertion)     2. Palpitations     3. Essential hypertension     4. Hyperlipidemia, unspecified hyperlipidemia type     5. Obesity (BMI 30.0-34.9)     6. Sleep disorder breathing                Getachew Slaughter M.D.  [address] [place] [address] [address]  [facility] [address]  VIA In Basket

## 2018-01-08 NOTE — LETTER
[facility] [address],   [address]  [place] [address]  Phone: [phone]  Fax: [phone]              Dio Bullard  1945    Encounter Date: 1/8/2018    Getachew Slaughter M.D.    Thank you for the referral. I had the pleasure of seeing Dio Bullard today in cardiology clinic. I've attached my visit note below. If you have any questions please feel free to give me a call anytime.      Prakash Humphries MD, PhD, [facility]  Cardiology and Lipidology  [facility]                                                                    PROGRESS NOTE:  Chief Complaint   Patient presents with   • Follow-Up     HTN, HLD    This patient is an established male who is here today to discuss:  He has home bilat arm BP /HRcheck record reviewed for whole year as well as  his WT; All adequate; He has likely White coat hypertension. He is on Amlodipine 10 mg/d; No other cardiac sx's;     Patient Active Problem List    Diagnosis Date Noted   • Obesity 12/11/2017   • Insomnia 12/01/2016   • Hyperlipidemia 05/18/2016   • Hyperglycemia 05/18/2016   • Essential hypertension 08/05/2015   • Low back pain 03/23/2015   • Hamstring tear 01/29/2015   • Lumbar disc disease with radiculopathy 12/10/2014   • Osteoarthritis of left knee 09/05/2014   • Localized osteoarthritis of right knee 09/05/2014       Past Medical History:   Diagnosis Date   • Cancer (CMS-[facility])    • Gilbert's disease    • Hypertension    • Insomnia      Past Surgical History:   Procedure Laterality Date   • HERNIA REPAIR     • PROSTATECTOMY, RADIAL       Social History     Social History   • Marital status:      Spouse name: N/A   • Number of children: N/A   • Years of education: N/A     Social History Main Topics   • Smoking status: Never Smoker   • Smokeless tobacco: Never Used   • Alcohol use No      Comment: occasionally   • Drug use: No   • Sexual  activity: Not on file     Other Topics Concern   • Not on file     Social History Narrative   • No narrative on file     Family History   Problem Relation Age of Onset   • Heart Disease Neg Hx        Current Outpatient Prescriptions   Medication Sig Dispense Refill   • zolpidem (AMBIEN CR) 12.5 MG CR tablet TAKE ONE TABLET BY MOUTH EVERY EVENING AS NEEDED FOR SLEEP 30 Tab 0   • Potassium 75 MG Tab Take  by mouth.     • vitamin D (CHOLECALCIFEROL) 1000 UNIT Tab Take 1,000 Units by mouth every day.     • diphenhydrAMINE (BENADRYL) 25 MG Tab Take 25 mg by mouth every 6 hours as needed for Sleep.     • amlodipine (NORVASC) 10 MG Tab TAKE ONE TABLET BY MOUTH DAILY 90 Tab 3   • Multiple Vitamins-Minerals (PRESERVISION AREDS) Cap Take  by mouth.     • Multiple Vitamins-Minerals (CENTRUM SILVER ADULT 50+ PO) Take  by mouth.     • Misc Natural Products (GLUCOS-CHONDROIT-MSM COMPLEX PO) Take  by mouth.       No current facility-administered medications for this visit.      Patient has no known allergies.    Review of Systems:     Constitutional: Denies fevers, Denies weight changes  Eyes: Denies changes in vision, no eye pain  Ears/Nose/Throat/Mouth: Denies nasal congestion or sore throat   Cardiovascular: Denies chest pain or palpitations   Respiratory: Denies shortness of breath , Denies cough  Gastrointestinal/Hepatic: Denies abdominal pain, nausea, vomiting, diarrhea, constipation or GI bleeding   Genitourinary: Denies bladder dysfunction, dysuria or frequency  Musculoskeletal/Rheum: Denies  joint pain and swelling   Skin/Breast: Denies rash, denies breast lumps or discharge  Neurological: Denies headache, confusion, memory loss or focal weakness/parasthesias  Psychiatric: denies mood disorder   Endocrine: denies hx of diabetes or thyroid dysfunction  Heme/Oncology/Lymph Nodes: Denies enlarged lymph nodes, denies brusing or known bleeding disorder  Allergic/Immunologic: Denies hx of allergies      All other systems were  "reviewed and are negative (AMA/CMS criteria)      Blood pressure 130/90, pulse 85, height 1.88 m (6' 2\"), weight 111.1 kg (245 lb), SpO2 95 %.  General Appearance: Well developed, Well nourished, No acute distress, Non-toxic appearance.    HENT: Normocephalic, Atraumatic, Oropharynx moist mucous membranes, Dentition: Mallampati 4 OP, Nose normal.    Eyes: PERRLA, EOMI, Conjunctiva normal, No discharge.  Neck: Normal range of motion, No cervical tenderness, Supple, No stridor, no JVD . No thyromegaly. No carotid bruit.  Cardiovascular: Normal heart rate, Normal rhythm, S1, S2, no S3, S4; No gallops; No murmurs, No rubs, . Extremitites with intact distal pulses, no cyanosis, clubbing or edema. No heaves, thrills, HJR;  Peripheral pulses: carotid 2+, brachial 2+, radial 2+, ulnar 2+, femoral 2+, popliteal 2+, PT 2+, DP 2+;  Lungs: Respiratory effort is normal. Normal breath sounds, breath sounds clear to auscultation bilaterally, no rales, no rhonchi, no wheezing.    Abdomen: Bowel sounds normal, Soft, No tenderness, No guarding, No rebound, No masses, No hepatosplenomegaly.  Skin: Warm, Dry, No erythema, No rash, no induration or crepitus.  Neurologic: Alert & oriented x 3, Normal motor function, Abn  sensory function at both feet, No focal deficits noted, cranial nerves II through XII are normal, normal gait.  Psychiatric: Affect normal, Judgment normal, Mood nallely    Results for EARNESTINE MADRID (MRN [id number]) as of 1/8/2018 15:17   Ref. Range 2/16/2017 10:15 5/1/2017 13:30 7/10/2017 10:00 10/12/2017 13:35 12/28/2017 13:00   WBC Latest Ref Range: 4.8 - 10.8 K/uL   7.5     RBC Latest Ref Range: 4.70 - 6.10 M/uL   5.46     Hemoglobin Latest Ref Range: 14.0 - 18.0 g/dL   16.0     Hematocrit Latest Ref Range: 42.0 - 52.0 %   45.9     MCV Latest Ref Range: 80.0 - 94.0 fL   84.1     MCH Latest Ref Range: 27.0 - 31.0 pg   29.3     MCHC Latest Ref Range: 33.0 - 37.0 g/dL   34.9     RDW Latest Ref Range: 11.5 - 14.5 %   " 13.3     Platelet Count Latest Ref Range: 130 - 400 K/uL   147     MPV Latest Ref Range: 7.4 - 10.4 fL   9.2     Neutrophils Automated Latest Ref Range: 39.0 - 70.0 %   68.9     Abs Neutrophils Automated Latest Ref Range: 1.8 - 7.7 K/uL   5.2     Lymphocytes Automated Latest Ref Range: 21.0 - 50.0 %   22.4     Abs Lymph Automated Latest Ref Range: 1.2 - 4.8 K/uL   1.7     Eosinophils Automated Latest Ref Range: 0.0 - 5.0 %   0.8     Basophils Automated Latest Ref Range: 0.0 - 3.0 %   0.4     Monocytes Automated Latest Ref Range: 2.0 - 9.0 %   6.8     Eosinophil Count, Blood Latest Ref Range: 0.00 - 0.50 K/uL   0.06     Sodium Latest Ref Range: 136 - 145 mmol/L   143 139 141   Potassium Latest Ref Range: 3.5 - 5.1 mmol/L   3.6 4.1 3.7   Chloride Latest Ref Range: 98 - 107 mmol/L   107 106 107   Co2 Latest Ref Range: 21 - 32 mmol/L   24 23 23   Anion Gap Latest Ref Range: 10 - 18 mmol/L   16 14 15   Glucose Latest Ref Range: 74 - 99 mg/dL   94 98 111 (H)   Bun Latest Ref Range: 7 - 18 mg/dL   18 22 (H) 20 (H)   Creatinine Latest Ref Range: 0.8 - 1.3 mg/dL 0.8  0.9 0.7 (L) 0.8   GFR If  Latest Ref Range: >60 mL/min/1.73 m 2 >60  >60 >60 >60   GFR If Non  Latest Ref Range: >60 mL/min/1.73 m 2 >60  >60 >60 >60   Calcium Latest Ref Range: 8.5 - 11.0 mg/dL   8.6 8.7 8.5   AST(SGOT) Latest Ref Range: 15 - 37 U/L   32     ALT(SGPT) Latest Ref Range: 12 - 78 U/L   46     Alkaline Phosphatase Latest Ref Range: 46 - 116 U/L   67     Total Bilirubin Latest Ref Range: 0.2 - 1.0 mg/dL   1.8 (H)     Albumin Latest Ref Range: 3.4 - 5.0 g/dL   3.9     Total Protein Latest Ref Range: 6.4 - 8.2 g/dL   6.9     A-G Ratio Unknown   1.3     Cholesterol,Tot Latest Ref Range: 120 - 200 mg/dL   144     Triglycerides Latest Ref Range: 0 - 150 mg/dL   31     HDL Latest Ref Range: 40.0 - 60.0 mg/dL   75.0 (H)     Non HDL Cholesterol Latest Ref Range: 30 - 160    69     LDL Latest Ref Range: <100 mg/dL   63        Chol-Hdl Ratio Unknown   1.92     Prostatic Specific Antigen Tot Latest Ref Range: 0.00 - 4.00 ng/mL  <0.13      TSH Latest Ref Range: 0.36 - 3.74 uIU/mL   1.21     Free T-4 Latest Ref Range: 0.76 - 1.46 ng/dL   1.25       Assessment and Plan.   72 y.o. male has likely white coat hypertension and on Amlodipine, Sinus HA discussed;     1. JENNINGS (dyspnea on exertion)  none    2. Palpitations  none    3. Essential hypertension  Controlled and see above [facility]    4. Hyperlipidemia, unspecified hyperlipidemia type  recheck    5. Obesity (BMI 30.0-34.9)  stable    6. Sleep disorder breathing  Sleeps OK      Return to clinic in  6 , months    1. JENNINGS (dyspnea on exertion)     2. Palpitations     3. Essential hypertension     4. Hyperlipidemia, unspecified hyperlipidemia type     5. Obesity (BMI 30.0-34.9)     6. Sleep disorder breathing           Getachew Slaughter M.D.  [address]  [facility] [address]  VIA In Basket

## 2018-01-08 NOTE — PROGRESS NOTES
Chief Complaint   Patient presents with   • Follow-Up     HTN, HLD    This patient is an established male who is here today to discuss:  He has home bilat arm BP /HRcheck record reviewed for whole year as well as  his WT; All adequate; He has likely White coat hypertension. He is on Amlodipine 10 mg/d; No other cardiac sx's;     Patient Active Problem List    Diagnosis Date Noted   • Obesity 12/11/2017   • Insomnia 12/01/2016   • Hyperlipidemia 05/18/2016   • Hyperglycemia 05/18/2016   • Essential hypertension 08/05/2015   • Low back pain 03/23/2015   • Hamstring tear 01/29/2015   • Lumbar disc disease with radiculopathy 12/10/2014   • Osteoarthritis of left knee 09/05/2014   • Localized osteoarthritis of right knee 09/05/2014       Past Medical History:   Diagnosis Date   • Cancer (CMS-[facility])    • Gilbert's disease    • Hypertension    • Insomnia      Past Surgical History:   Procedure Laterality Date   • HERNIA REPAIR     • PROSTATECTOMY, RADIAL       Social History     Social History   • Marital status:      Spouse name: N/A   • Number of children: N/A   • Years of education: N/A     Social History Main Topics   • Smoking status: Never Smoker   • Smokeless tobacco: Never Used   • Alcohol use No      Comment: occasionally   • Drug use: No   • Sexual activity: Not on file     Other Topics Concern   • Not on file     Social History Narrative   • No narrative on file     Family History   Problem Relation Age of Onset   • Heart Disease Neg Hx        Current Outpatient Prescriptions   Medication Sig Dispense Refill   • zolpidem (AMBIEN CR) 12.5 MG CR tablet TAKE ONE TABLET BY MOUTH EVERY EVENING AS NEEDED FOR SLEEP 30 Tab 0   • Potassium 75 MG Tab Take  by mouth.     • vitamin D (CHOLECALCIFEROL) 1000 UNIT Tab Take 1,000 Units by mouth every day.     • diphenhydrAMINE (BENADRYL) 25 MG Tab Take 25 mg by mouth every 6 hours as needed for Sleep.     • amlodipine (NORVASC) 10 MG Tab TAKE ONE TABLET BY MOUTH DAILY 90  "Tab 3   • Multiple Vitamins-Minerals (PRESERVISION AREDS) Cap Take  by mouth.     • Multiple Vitamins-Minerals (CENTRUM SILVER ADULT 50+ PO) Take  by mouth.     • Misc Natural Products (GLUCOS-CHONDROIT-MSM COMPLEX PO) Take  by mouth.       No current facility-administered medications for this visit.      Patient has no known allergies.    Review of Systems:     Constitutional: Denies fevers, Denies weight changes  Eyes: Denies changes in vision, no eye pain  Ears/Nose/Throat/Mouth: Denies nasal congestion or sore throat   Cardiovascular: Denies chest pain or palpitations   Respiratory: Denies shortness of breath , Denies cough  Gastrointestinal/Hepatic: Denies abdominal pain, nausea, vomiting, diarrhea, constipation or GI bleeding   Genitourinary: Denies bladder dysfunction, dysuria or frequency  Musculoskeletal/Rheum: Denies  joint pain and swelling   Skin/Breast: Denies rash, denies breast lumps or discharge  Neurological: Denies headache, confusion, memory loss or focal weakness/parasthesias  Psychiatric: denies mood disorder   Endocrine: denies hx of diabetes or thyroid dysfunction  Heme/Oncology/Lymph Nodes: Denies enlarged lymph nodes, denies brusing or known bleeding disorder  Allergic/Immunologic: Denies hx of allergies      All other systems were reviewed and are negative (AMA/CMS criteria)      Blood pressure 130/90, pulse 85, height 1.88 m (6' 2\"), weight 111.1 kg (245 lb), SpO2 95 %.  General Appearance: Well developed, Well nourished, No acute distress, Non-toxic appearance.    HENT: Normocephalic, Atraumatic, Oropharynx moist mucous membranes, Dentition: Mallampati 4 OP, Nose normal.    Eyes: PERRLA, EOMI, Conjunctiva normal, No discharge.  Neck: Normal range of motion, No cervical tenderness, Supple, No stridor, no JVD . No thyromegaly. No carotid bruit.  Cardiovascular: Normal heart rate, Normal rhythm, S1, S2, no S3, S4; No gallops; No murmurs, No rubs, . Extremitites with intact distal pulses, no " cyanosis, clubbing or edema. No heaves, thrills, HJR;  Peripheral pulses: carotid 2+, brachial 2+, radial 2+, ulnar 2+, femoral 2+, popliteal 2+, PT 2+, DP 2+;  Lungs: Respiratory effort is normal. Normal breath sounds, breath sounds clear to auscultation bilaterally, no rales, no rhonchi, no wheezing.    Abdomen: Bowel sounds normal, Soft, No tenderness, No guarding, No rebound, No masses, No hepatosplenomegaly.  Skin: Warm, Dry, No erythema, No rash, no induration or crepitus.  Neurologic: Alert & oriented x 3, Normal motor function, Abn  sensory function at both feet, No focal deficits noted, cranial nerves II through XII are normal, normal gait.  Psychiatric: Affect normal, Judgment normal, Mood nallely    Results for EARNESTINE MADRID (MRN [id number]) as of 1/8/2018 15:17   Ref. Range 2/16/2017 10:15 5/1/2017 13:30 7/10/2017 10:00 10/12/2017 13:35 12/28/2017 13:00   WBC Latest Ref Range: 4.8 - 10.8 K/uL   7.5     RBC Latest Ref Range: 4.70 - 6.10 M/uL   5.46     Hemoglobin Latest Ref Range: 14.0 - 18.0 g/dL   16.0     Hematocrit Latest Ref Range: 42.0 - 52.0 %   45.9     MCV Latest Ref Range: 80.0 - 94.0 fL   84.1     MCH Latest Ref Range: 27.0 - 31.0 pg   29.3     MCHC Latest Ref Range: 33.0 - 37.0 g/dL   34.9     RDW Latest Ref Range: 11.5 - 14.5 %   13.3     Platelet Count Latest Ref Range: 130 - 400 K/uL   147     MPV Latest Ref Range: 7.4 - 10.4 fL   9.2     Neutrophils Automated Latest Ref Range: 39.0 - 70.0 %   68.9     Abs Neutrophils Automated Latest Ref Range: 1.8 - 7.7 K/uL   5.2     Lymphocytes Automated Latest Ref Range: 21.0 - 50.0 %   22.4     Abs Lymph Automated Latest Ref Range: 1.2 - 4.8 K/uL   1.7     Eosinophils Automated Latest Ref Range: 0.0 - 5.0 %   0.8     Basophils Automated Latest Ref Range: 0.0 - 3.0 %   0.4     Monocytes Automated Latest Ref Range: 2.0 - 9.0 %   6.8     Eosinophil Count, Blood Latest Ref Range: 0.00 - 0.50 K/uL   0.06     Sodium Latest Ref Range: 136 - 145 mmol/L   143  139 141   Potassium Latest Ref Range: 3.5 - 5.1 mmol/L   3.6 4.1 3.7   Chloride Latest Ref Range: 98 - 107 mmol/L   107 106 107   Co2 Latest Ref Range: 21 - 32 mmol/L   24 23 23   Anion Gap Latest Ref Range: 10 - 18 mmol/L   16 14 15   Glucose Latest Ref Range: 74 - 99 mg/dL   94 98 111 (H)   Bun Latest Ref Range: 7 - 18 mg/dL   18 22 (H) 20 (H)   Creatinine Latest Ref Range: 0.8 - 1.3 mg/dL 0.8  0.9 0.7 (L) 0.8   GFR If  Latest Ref Range: >60 mL/min/1.73 m 2 >60  >60 >60 >60   GFR If Non  Latest Ref Range: >60 mL/min/1.73 m 2 >60  >60 >60 >60   Calcium Latest Ref Range: 8.5 - 11.0 mg/dL   8.6 8.7 8.5   AST(SGOT) Latest Ref Range: 15 - 37 U/L   32     ALT(SGPT) Latest Ref Range: 12 - 78 U/L   46     Alkaline Phosphatase Latest Ref Range: 46 - 116 U/L   67     Total Bilirubin Latest Ref Range: 0.2 - 1.0 mg/dL   1.8 (H)     Albumin Latest Ref Range: 3.4 - 5.0 g/dL   3.9     Total Protein Latest Ref Range: 6.4 - 8.2 g/dL   6.9     A-G Ratio Unknown   1.3     Cholesterol,Tot Latest Ref Range: 120 - 200 mg/dL   144     Triglycerides Latest Ref Range: 0 - 150 mg/dL   31     HDL Latest Ref Range: 40.0 - 60.0 mg/dL   75.0 (H)     Non HDL Cholesterol Latest Ref Range: 30 - 160    69     LDL Latest Ref Range: <100 mg/dL   63     Chol-Hdl Ratio Unknown   1.92     Prostatic Specific Antigen Tot Latest Ref Range: 0.00 - 4.00 ng/mL  <0.13      TSH Latest Ref Range: 0.36 - 3.74 uIU/mL   1.21     Free T-4 Latest Ref Range: 0.76 - 1.46 ng/dL   1.25       Assessment and Plan.   72 y.o. male has likely white coat hypertension and on Amlodipine, Sinus HA discussed;     1. JENNINGS (dyspnea on exertion)  none    2. Palpitations  none    3. Essential hypertension  Controlled and see above [facility]    4. Hyperlipidemia, unspecified hyperlipidemia type  recheck    5. Obesity (BMI 30.0-34.9)  stable    6. Sleep disorder breathing  Sleeps OK      Return to clinic in  6 , months    1. JENNINGS (dyspnea on exertion)     2.  Palpitations     3. Essential hypertension     4. Hyperlipidemia, unspecified hyperlipidemia type     5. Obesity (BMI 30.0-34.9)     6. Sleep disorder breathing

## 2018-04-24 DIAGNOSIS — I10 ESSENTIAL HYPERTENSION: ICD-10-CM

## 2018-04-24 RX ORDER — AMLODIPINE BESYLATE 10 MG/1
10 TABLET ORAL DAILY
Qty: 90 TAB | Refills: 2 | Status: SHIPPED | OUTPATIENT
Start: 2018-04-24 | End: 2023-04-14

## 2018-05-24 ENCOUNTER — OFFICE VISIT (OUTPATIENT)
Dept: CARDIOLOGY | Facility: CLINIC | Age: 73
End: 2018-05-24
Payer: MEDICARE

## 2018-05-24 VITALS
SYSTOLIC BLOOD PRESSURE: 134 MMHG | DIASTOLIC BLOOD PRESSURE: 80 MMHG | HEART RATE: 79 BPM | BODY MASS INDEX: 32.6 KG/M2 | WEIGHT: 254 LBS | HEIGHT: 74 IN | OXYGEN SATURATION: 94 %

## 2018-05-24 DIAGNOSIS — I10 ESSENTIAL HYPERTENSION: ICD-10-CM

## 2018-05-24 DIAGNOSIS — Z13.220 SCREENING CHOLESTEROL LEVEL: ICD-10-CM

## 2018-05-24 PROCEDURE — 99213 OFFICE O/P EST LOW 20 MIN: CPT | Performed by: INTERNAL MEDICINE

## 2018-05-24 ASSESSMENT — ENCOUNTER SYMPTOMS
PND: 0
FALLS: 0
ORTHOPNEA: 0
PALPITATIONS: 0
DIZZINESS: 0
DEPRESSION: 0
ABDOMINAL PAIN: 0
LOSS OF CONSCIOUSNESS: 0
SHORTNESS OF BREATH: 0

## 2018-05-24 NOTE — LETTER
[facility] [place] [address],   [address]  [place] [address]  Phone: [phone]  Fax: [phone]              Dio Bullard  1945    Encounter Date: 5/24/2018    Johnna Chavez M.D.          PROGRESS NOTE:  Chief Complaint   Patient presents with   • HTN (Controlled)       Subjective:   Dio Bullard is a 72 y.o. male who presents today to follow-up on his hypertension.  His blood pressure has been mostly 120 systolic.  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  He is requesting our records to be transferred to them.    He denies any history of hyperlipidemia.  Has never been on cholesterol medications.    Stays active on a regular basis.  Denies any chest discomfort or other anginal symptoms.    Past Medical History:   Diagnosis Date   • Cancer (HCC)    • Gilbert's disease    • Hypertension    • Insomnia      Past Surgical History:   Procedure Laterality Date   • HERNIA REPAIR     • PROSTATECTOMY, RADIAL       Family History   Problem Relation Age of Onset   • Heart Disease Neg Hx      Social History     Social History   • Marital status:      Spouse name: N/A   • Number of children: N/A   • Years of education: N/A     Occupational History   • Not on file.     Social History Main Topics   • Smoking status: Never Smoker   • Smokeless tobacco: Never Used   • Alcohol use No      Comment: occasionally   • Drug use: No   • Sexual activity: Not on file     Other Topics Concern   • Not on file     Social History Narrative   • No narrative on file     No Known Allergies  Outpatient Encounter Prescriptions as of 5/24/2018   Medication Sig Dispense Refill   • zolpidem (AMBIEN CR) 12.5 MG CR tablet TAKE ONE TABLET BY MOUTH EVERY EVENING AS NEEDED FOR SLEEP -30 DAYS- 30 Tab 0   • amLODIPine (NORVASC) 10 MG Tab Take 1 Tab by  "mouth every day. For further refills please contact new cardiologist. Thank you 90 Tab 2   • Potassium 75 MG Tab Take  by mouth.     • vitamin D (CHOLECALCIFEROL) 1000 UNIT Tab Take 1,000 Units by mouth every day.     • diphenhydrAMINE (BENADRYL) 25 MG Tab Take 25 mg by mouth every 6 hours as needed for Sleep.     • Multiple Vitamins-Minerals (PRESERVISION AREDS) Cap Take  by mouth.     • Multiple Vitamins-Minerals (CENTRUM SILVER ADULT 50+ PO) Take  by mouth.     • Misc Natural Products (GLUCOS-CHONDROIT-MSM COMPLEX PO) Take  by mouth.       No facility-administered encounter medications on file as of 5/24/2018.      Review of Systems   Constitutional: Negative for malaise/fatigue.   Respiratory: Negative for shortness of breath.    Cardiovascular: Negative for chest pain, palpitations, orthopnea, leg swelling and PND.   Gastrointestinal: Negative for abdominal pain.   Musculoskeletal: Negative for falls.   Neurological: Negative for dizziness and loss of consciousness.   Psychiatric/Behavioral: Negative for depression.   All other systems reviewed and are negative.       Objective:   /80   Pulse 79   Ht 1.88 m (6' 2\")   Wt 115.2 kg (254 lb)   SpO2 94%   BMI 32.61 kg/m²      Physical Exam   Constitutional: He is oriented to person, place, and time. He appears well-developed and well-nourished. No distress.   HENT:   Head: Normocephalic and atraumatic.   Eyes: Conjunctivae are normal.   Neck: Normal range of motion. Neck supple.   Cardiovascular: Normal rate, regular rhythm and normal heart sounds.  Exam reveals no gallop and no friction rub.    No murmur heard.  Pulmonary/Chest: Effort normal and breath sounds normal. No respiratory distress. He has no wheezes. He has no rales.   Abdominal: Soft. He exhibits no distension. There is no tenderness.   Musculoskeletal: He exhibits no edema.   Neurological: He is alert and oriented to person, place, and time.   Skin: Skin is warm and dry. He is not " diaphoretic.   Psychiatric: He has a normal mood and affect. His behavior is normal.   Nursing note and vitals reviewed.    Labs performed in December 2017 was reviewed and showed creatinine 0.8.    Lipid panel in July 2017 showed LDL 63.    Assessment:     1. Essential hypertension     2. Screening cholesterol level  LIPID PROFILE       Medical Decision Making:  Today's Assessment / Status / Plan:     Hypertension: Blood pressure is at goal.  Continue amlodipine at current dose.    No history of hyperlipidemia per patient.  EMR states history of hyperlipidemia. hyperlipidemia removed from the list for now.  A repeat lipid panel was ordered today.    Return to clinic in 1 year or earlier if needed.    Thank you for allowing me to participate in the care of this patient. Please do not hesitate to contact me with any questions.    Johnna Chavez MD  Cardiologist  [facility]      PLEASE NOTE: This dictation was created using voice recognition software.         Jennifer Motta M.D.  [address] [facility] [place] [address]  VIA Facsimile: [phone]

## 2018-05-24 NOTE — PROGRESS NOTES
Chief Complaint   Patient presents with   • HTN (Controlled)       Subjective:   Dio Bullard is a 72 y.o. male who presents today to follow-up on his hypertension.  His blood pressure has been mostly 120 systolic.  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  He is requesting our records to be transferred to them.    He denies any history of hyperlipidemia.  Has never been on cholesterol medications.    Stays active on a regular basis.  Denies any chest discomfort or other anginal symptoms.    Past Medical History:   Diagnosis Date   • Cancer (HCC)    • Gilbert's disease    • Hypertension    • Insomnia      Past Surgical History:   Procedure Laterality Date   • HERNIA REPAIR     • PROSTATECTOMY, RADIAL       Family History   Problem Relation Age of Onset   • Heart Disease Neg Hx      Social History     Social History   • Marital status:      Spouse name: N/A   • Number of children: N/A   • Years of education: N/A     Occupational History   • Not on file.     Social History Main Topics   • Smoking status: Never Smoker   • Smokeless tobacco: Never Used   • Alcohol use No      Comment: occasionally   • Drug use: No   • Sexual activity: Not on file     Other Topics Concern   • Not on file     Social History Narrative   • No narrative on file     No Known Allergies  Outpatient Encounter Prescriptions as of 5/24/2018   Medication Sig Dispense Refill   • zolpidem (AMBIEN CR) 12.5 MG CR tablet TAKE ONE TABLET BY MOUTH EVERY EVENING AS NEEDED FOR SLEEP -30 DAYS- 30 Tab 0   • amLODIPine (NORVASC) 10 MG Tab Take 1 Tab by mouth every day. For further refills please contact new cardiologist. Thank you 90 Tab 2   • Potassium 75 MG Tab Take  by mouth.     • vitamin D (CHOLECALCIFEROL) 1000 UNIT Tab Take 1,000 Units by mouth every day.     • diphenhydrAMINE (BENADRYL) 25 MG Tab Take 25 mg by mouth every 6 hours  "as needed for Sleep.     • Multiple Vitamins-Minerals (PRESERVISION AREDS) Cap Take  by mouth.     • Multiple Vitamins-Minerals (CENTRUM SILVER ADULT 50+ PO) Take  by mouth.     • Misc Natural Products (GLUCOS-CHONDROIT-MSM COMPLEX PO) Take  by mouth.       No facility-administered encounter medications on file as of 5/24/2018.      Review of Systems   Constitutional: Negative for malaise/fatigue.   Respiratory: Negative for shortness of breath.    Cardiovascular: Negative for chest pain, palpitations, orthopnea, leg swelling and PND.   Gastrointestinal: Negative for abdominal pain.   Musculoskeletal: Negative for falls.   Neurological: Negative for dizziness and loss of consciousness.   Psychiatric/Behavioral: Negative for depression.   All other systems reviewed and are negative.       Objective:   /80   Pulse 79   Ht 1.88 m (6' 2\")   Wt 115.2 kg (254 lb)   SpO2 94%   BMI 32.61 kg/m²     Physical Exam   Constitutional: He is oriented to person, place, and time. He appears well-developed and well-nourished. No distress.   HENT:   Head: Normocephalic and atraumatic.   Eyes: Conjunctivae are normal.   Neck: Normal range of motion. Neck supple.   Cardiovascular: Normal rate, regular rhythm and normal heart sounds.  Exam reveals no gallop and no friction rub.    No murmur heard.  Pulmonary/Chest: Effort normal and breath sounds normal. No respiratory distress. He has no wheezes. He has no rales.   Abdominal: Soft. He exhibits no distension. There is no tenderness.   Musculoskeletal: He exhibits no edema.   Neurological: He is alert and oriented to person, place, and time.   Skin: Skin is warm and dry. He is not diaphoretic.   Psychiatric: He has a normal mood and affect. His behavior is normal.   Nursing note and vitals reviewed.    Labs performed in December 2017 was reviewed and showed creatinine 0.8.    Lipid panel in July 2017 showed LDL 63.    Assessment:     1. Essential hypertension     2. Screening " cholesterol level  LIPID PROFILE       Medical Decision Making:  Today's Assessment / Status / Plan:     Hypertension: Blood pressure is at goal.  Continue amlodipine at current dose.    No history of hyperlipidemia per patient.  EMR states history of hyperlipidemia. hyperlipidemia removed from the list for now.  A repeat lipid panel was ordered today.    Return to clinic in 1 year or earlier if needed.    Thank you for allowing me to participate in the care of this patient. Please do not hesitate to contact me with any questions.    Johnna Chavez MD  Cardiologist  [facility]      PLEASE NOTE: This dictation was created using voice recognition software.

## 2018-07-23 ENCOUNTER — TELEPHONE (OUTPATIENT)
Dept: CARDIOLOGY | Facility: MEDICAL CENTER | Age: 73
End: 2018-07-23

## 2018-07-23 NOTE — TELEPHONE ENCOUNTER
Called and left detailed message educating pt on lipid results per Dr. Chavez. Informed him to please call back with any questions or concerns.     ----- Message from Johnna Chavez M.D. sent at 7/23/2018 10:41 AM PDT -----  Lipid panel reviewed. Looks good.   No changes for now.   Thank you   AA

## 2019-05-21 ENCOUNTER — TELEPHONE (OUTPATIENT)
Dept: CARDIOLOGY | Facility: MEDICAL CENTER | Age: 74
End: 2019-05-21

## 2019-05-21 DIAGNOSIS — E66.9 OBESITY, UNSPECIFIED CLASSIFICATION, UNSPECIFIED OBESITY TYPE, UNSPECIFIED WHETHER SERIOUS COMORBIDITY PRESENT: ICD-10-CM

## 2019-05-21 DIAGNOSIS — I10 ESSENTIAL HYPERTENSION: ICD-10-CM

## 2019-05-21 NOTE — TELEPHONE ENCOUNTER
----- Message from Lisa Sheth, Med Ass't sent at 5/21/2019 10:20 AM PDT -----  Regarding: lab orders  Can you please see what AA would like for this pt labs before his appt on 6/6/19 if possible>        Thank You    Tiny

## 2019-06-06 ENCOUNTER — OFFICE VISIT (OUTPATIENT)
Dept: CARDIOLOGY | Facility: CLINIC | Age: 74
End: 2019-06-06
Payer: MEDICARE

## 2019-06-06 VITALS
BODY MASS INDEX: 32.98 KG/M2 | OXYGEN SATURATION: 95 % | HEART RATE: 80 BPM | HEIGHT: 74 IN | SYSTOLIC BLOOD PRESSURE: 138 MMHG | DIASTOLIC BLOOD PRESSURE: 80 MMHG | WEIGHT: 257 LBS

## 2019-06-06 DIAGNOSIS — E78.49 OTHER HYPERLIPIDEMIA: ICD-10-CM

## 2019-06-06 DIAGNOSIS — I10 ESSENTIAL HYPERTENSION: ICD-10-CM

## 2019-06-06 DIAGNOSIS — Z79.899 ENCOUNTER FOR LONG-TERM (CURRENT) USE OF HIGH-RISK MEDICATION: ICD-10-CM

## 2019-06-06 PROCEDURE — 99214 OFFICE O/P EST MOD 30 MIN: CPT | Performed by: INTERNAL MEDICINE

## 2019-06-06 ASSESSMENT — ENCOUNTER SYMPTOMS
ABDOMINAL PAIN: 0
DEPRESSION: 0
LOSS OF CONSCIOUSNESS: 0
ORTHOPNEA: 0
DIZZINESS: 0
SHORTNESS OF BREATH: 0
PALPITATIONS: 0
FALLS: 0
PND: 0

## 2019-06-06 NOTE — PROGRESS NOTES
Chief Complaint   Patient presents with   • HTN (Controlled)     Subjective:   Dio Bullard is a 73-year-old male presented clinic for follow-up on hypertension.    His blood pressures have been well controlled usually 120s to 130s systolic.    He was recently started on a statin by his primary care physician and wants to know if he needs to continue this.  He was given a statin that did not work for him and he is now on another medication.  He does not recall its name.  He continues to exercise regularly.  Denies any anginal symptoms.    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  He was previously seen in our clinic for this reason.    Past Medical History:   Diagnosis Date   • Cancer (HCC)    • Gilbert's disease    • Hypertension    • Insomnia      Past Surgical History:   Procedure Laterality Date   • HERNIA REPAIR     • PROSTATECTOMY, RADIAL       Family History   Problem Relation Age of Onset   • Heart Disease Neg Hx      Social History     Social History   • Marital status:      Spouse name: N/A   • Number of children: N/A   • Years of education: N/A     Occupational History   • Not on file.     Social History Main Topics   • Smoking status: Never Smoker   • Smokeless tobacco: Never Used   • Alcohol use No      Comment: occasionally   • Drug use: No   • Sexual activity: Not on file     Other Topics Concern   • Not on file     Social History Narrative   • No narrative on file     No Known Allergies  Outpatient Encounter Prescriptions as of 6/6/2019   Medication Sig Dispense Refill   • amLODIPine (NORVASC) 10 MG Tab Take 1 Tab by mouth every day. For further refills please contact new cardiologist. Thank you 90 Tab 2   • Potassium 75 MG Tab Take  by mouth.     • vitamin D (CHOLECALCIFEROL) 1000 UNIT Tab Take 1,000 Units by mouth every day.     • diphenhydrAMINE (BENADRYL) 25 MG Tab Take 25 mg by mouth every 6 hours as needed for Sleep.  "    • Multiple Vitamins-Minerals (PRESERVISION AREDS) Cap Take  by mouth.     • Multiple Vitamins-Minerals (CENTRUM SILVER ADULT 50+ PO) Take  by mouth.     • Misc Natural Products (GLUCOS-CHONDROIT-MSM COMPLEX PO) Take  by mouth.       No facility-administered encounter medications on file as of 6/6/2019.      Review of Systems   Constitutional: Negative for malaise/fatigue.   Respiratory: Negative for shortness of breath.    Cardiovascular: Negative for chest pain, palpitations, orthopnea, leg swelling and PND.   Gastrointestinal: Negative for abdominal pain.   Musculoskeletal: Negative for falls.   Neurological: Negative for dizziness and loss of consciousness.   Psychiatric/Behavioral: Negative for depression.   All other systems reviewed and are negative.       Objective:   /80 (BP Location: Right arm, Patient Position: Sitting)   Pulse 80   Ht 1.88 m (6' 2\")   Wt 116.6 kg (257 lb)   SpO2 95%   BMI 33.00 kg/m²     Physical Exam   Constitutional: He is oriented to person, place, and time. He appears well-developed and well-nourished. No distress.   HENT:   Head: Normocephalic and atraumatic.   Eyes: Conjunctivae are normal. No scleral icterus.   Neck: Normal range of motion. Neck supple.   Cardiovascular: Normal rate, regular rhythm and normal heart sounds.  Exam reveals no gallop and no friction rub.    No murmur heard.  Pulmonary/Chest: Effort normal and breath sounds normal. No respiratory distress. He has no wheezes. He has no rales.   Abdominal: Soft. He exhibits no distension. There is no tenderness.   Musculoskeletal: He exhibits no edema.   Neurological: He is alert and oriented to person, place, and time.   Skin: Skin is warm and dry. He is not diaphoretic.   Psychiatric: He has a normal mood and affect. His behavior is normal.   Nursing note and vitals reviewed.    Labs performed in June 2019 were reviewed and showed normal creatinine.  LDL 90.  HDL 62.    Assessment:     1. Essential " hypertension     2. Other hyperlipidemia     3. Encounter for long-term (current) use of high-risk medication         Medical Decision Making:  Today's Assessment / Status / Plan:     Hypertension: Blood pressure is at goal.  Continue amlodipine at current dose.    Hyperlipidemia: I personally reviewed the patient's lipids which are excellent.  His LDL is great along with his HDL.  I do not see any clear indication for him being on a statin.  Unfortunately patient cannot remember the name of the medication that has been prescribed to him by his PCP.  I will defer the final decision to keep him on a statin or another lipid medication to Dr. Motta.     Patient is doing well from a cardiac standpoint.  We reviewed all his lab results in detail today including indication for medications as noted above.    Total 26 minutes face-to-face time spent with patient, with greater than 50% of the total time discussing patient's issues and symptoms as listed above in assessment and plan, as well as managing coordination of care for future evaluation and treatment. Most of the time was spent discussing the above in detail.  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    Patient is agreeable with the plan.  He will return as needed.      Return to clinic if needed.    Thank you for allowing me to participate in the care of this patient. Please do not hesitate to contact me with any questions.    Johnna Chavez MD  Cardiologist  [facility]      PLEASE NOTE: This dictation was created using voice recognition software.

## 2021-01-26 ENCOUNTER — TELEPHONE (OUTPATIENT)
Dept: CARDIOLOGY | Facility: MEDICAL CENTER | Age: 76
End: 2021-01-26

## 2021-01-26 NOTE — TELEPHONE ENCOUNTER
AA    NM: Dee Solares    PH: [phone]   PT NM: Dee Solares   : 49   REG DR: Dr Chavez    RE: Has an appointment  at 10:40a  and wasn't sure if he needed to have  blood work done prior.    DISP HIST: 2021 03:27P [facility]  p/disp    --------------------------------------  Message History  Account: [id number]  Taken:  2021  3:27p [facility]  Serial#: [id number]      Thank you,  Marianne NATION

## 2021-01-27 NOTE — TELEPHONE ENCOUNTER
Confirmed with pt that he had recent labs completed on 12/09/20 and advised no current labs recommended at this time. Advised if AA recommends follow up lab work, this will be discussed at his [facility]. Pt verbalized understanding and appreciative of call back.

## 2021-03-16 PROBLEM — M71.22 SYNOVIAL CYST OF LEFT POPLITEAL SPACE: Status: ACTIVE | Noted: 2021-03-16

## 2021-03-16 PROBLEM — Z85.46 HISTORY OF PROSTATE CANCER: Status: ACTIVE | Noted: 2021-03-16

## 2021-03-30 PROBLEM — I25.10 CORONARY ARTERY DISEASE INVOLVING NATIVE HEART WITHOUT ANGINA PECTORIS: Status: ACTIVE | Noted: 2021-03-30

## 2022-11-09 PROBLEM — I51.89 DIASTOLIC DYSFUNCTION: Status: ACTIVE | Noted: 2021-07-09

## 2023-08-08 PROBLEM — M25.662 KNEE STIFFNESS, LEFT: Status: ACTIVE | Noted: 2023-08-08

## 2023-08-08 PROBLEM — M25.562 ACUTE PAIN OF LEFT KNEE: Status: ACTIVE | Noted: 2023-08-08

## 2023-08-08 PROBLEM — R26.2 DIFFICULTY WALKING: Status: ACTIVE | Noted: 2023-08-08

## 2023-08-08 PROBLEM — Z96.652 STATUS POST TOTAL LEFT KNEE REPLACEMENT: Status: ACTIVE | Noted: 2023-08-08
